# Patient Record
Sex: FEMALE | Race: WHITE | Employment: UNEMPLOYED | URBAN - METROPOLITAN AREA
[De-identification: names, ages, dates, MRNs, and addresses within clinical notes are randomized per-mention and may not be internally consistent; named-entity substitution may affect disease eponyms.]

---

## 2019-09-27 ENCOUNTER — TELEPHONE (OUTPATIENT)
Dept: OBGYN | Age: 24
End: 2019-09-27

## 2019-10-15 ENCOUNTER — HOSPITAL ENCOUNTER (OUTPATIENT)
Age: 24
Discharge: HOME | End: 2019-10-15
Payer: MEDICAID

## 2019-10-15 ENCOUNTER — INITIAL PRENATAL (OUTPATIENT)
Dept: OBGYN | Age: 24
End: 2019-10-15
Payer: MEDICAID

## 2019-10-15 VITALS
WEIGHT: 150 LBS | DIASTOLIC BLOOD PRESSURE: 68 MMHG | SYSTOLIC BLOOD PRESSURE: 121 MMHG | HEART RATE: 89 BPM | HEIGHT: 67 IN | BODY MASS INDEX: 23.54 KG/M2

## 2019-10-15 DIAGNOSIS — Z34.02 ENCOUNTER FOR SUPERVISION OF NORMAL FIRST PREGNANCY IN SECOND TRIMESTER: Primary | ICD-10-CM

## 2019-10-15 LAB
ABO/RH: NORMAL
ANTIBODY SCREEN: NORMAL
BILIRUBIN URINE: NEGATIVE MG/DL
BLOOD, URINE: ABNORMAL
CLARITY: CLEAR
COLOR: YELLOW
GLUCOSE URINE: NEGATIVE MG/DL
HEPATITIS C ANTIBODY INTERPRETATION: NORMAL
KETONES, URINE: NEGATIVE MG/DL
LEUKOCYTE ESTERASE, URINE: NEGATIVE
NITRITE, URINE: NEGATIVE
PH UA: 6.5 (ref 5–8)
PROTEIN UA: NEGATIVE MG/DL
SPECIFIC GRAVITY UA: 1.02 (ref 1–1.03)
UROBILINOGEN, URINE: 0.2 E.U./DL

## 2019-10-15 PROCEDURE — 86780 TREPONEMA PALLIDUM: CPT

## 2019-10-15 PROCEDURE — 88175 CYTOPATH C/V AUTO FLUID REDO: CPT

## 2019-10-15 PROCEDURE — 86701 HIV-1ANTIBODY: CPT

## 2019-10-15 PROCEDURE — 85025 COMPLETE CBC W/AUTO DIFF WBC: CPT

## 2019-10-15 PROCEDURE — 87086 URINE CULTURE/COLONY COUNT: CPT

## 2019-10-15 PROCEDURE — 86702 HIV-2 ANTIBODY: CPT

## 2019-10-15 PROCEDURE — 86901 BLOOD TYPING SEROLOGIC RH(D): CPT

## 2019-10-15 PROCEDURE — 86900 BLOOD TYPING SEROLOGIC ABO: CPT

## 2019-10-15 PROCEDURE — 87491 CHLMYD TRACH DNA AMP PROBE: CPT

## 2019-10-15 PROCEDURE — 36415 COLL VENOUS BLD VENIPUNCTURE: CPT

## 2019-10-15 PROCEDURE — 99204 OFFICE O/P NEW MOD 45 MIN: CPT | Performed by: OBSTETRICS & GYNECOLOGY

## 2019-10-15 PROCEDURE — 87390 HIV-1 AG IA: CPT

## 2019-10-15 PROCEDURE — 86762 RUBELLA ANTIBODY: CPT

## 2019-10-15 PROCEDURE — 86850 RBC ANTIBODY SCREEN: CPT

## 2019-10-15 PROCEDURE — 87624 HPV HI-RISK TYP POOLED RSLT: CPT

## 2019-10-15 PROCEDURE — 87591 N.GONORRHOEAE DNA AMP PROB: CPT

## 2019-10-15 PROCEDURE — 81003 URINALYSIS AUTO W/O SCOPE: CPT

## 2019-10-15 PROCEDURE — 87340 HEPATITIS B SURFACE AG IA: CPT

## 2019-10-15 PROCEDURE — 86803 HEPATITIS C AB TEST: CPT

## 2019-10-15 RX ORDER — VITAMIN A, VITAMIN C, VITAMIN D-3, VITAMIN E, VITAMIN B-1, VITAMIN B-2, NIACIN, VITAMIN B-6, CALCIUM, IRON, ZINC, COPPER 4000; 120; 400; 22; 1.84; 3; 20; 10; 1; 12; 200; 27; 25; 2 [IU]/1; MG/1; [IU]/1; MG/1; MG/1; MG/1; MG/1; MG/1; MG/1; UG/1; MG/1; MG/1; MG/1; MG/1
1 TABLET ORAL DAILY
Qty: 30 TABLET | Refills: 11 | Status: ON HOLD | OUTPATIENT
Start: 2019-10-15 | End: 2020-03-27 | Stop reason: HOSPADM

## 2019-10-16 LAB
BASOPHILS ABSOLUTE: 0 K/UL (ref 0–0.2)
BASOPHILS RELATIVE PERCENT: 0.4 %
EOSINOPHILS ABSOLUTE: 0.1 K/UL (ref 0–0.6)
EOSINOPHILS RELATIVE PERCENT: 1.3 %
HCT VFR BLD CALC: 35.1 % (ref 36–48)
HEMOGLOBIN: 12 G/DL (ref 12–16)
HEPATITIS B SURFACE ANTIGEN INTERPRETATION: ABNORMAL
HIV AG/AB: NORMAL
HIV ANTIGEN: NORMAL
HIV-1 ANTIBODY: NORMAL
HIV-2 AB: NORMAL
LYMPHOCYTES ABSOLUTE: 1.2 K/UL (ref 1–5.1)
LYMPHOCYTES RELATIVE PERCENT: 15.8 %
MCH RBC QN AUTO: 30.2 PG (ref 26–34)
MCHC RBC AUTO-ENTMCNC: 34.2 G/DL (ref 31–36)
MCV RBC AUTO: 88.4 FL (ref 80–100)
MONOCYTES ABSOLUTE: 0.4 K/UL (ref 0–1.3)
MONOCYTES RELATIVE PERCENT: 5.9 %
NEUTROPHILS ABSOLUTE: 5.7 K/UL (ref 1.7–7.7)
NEUTROPHILS RELATIVE PERCENT: 76.6 %
PDW BLD-RTO: 12.3 % (ref 12.4–15.4)
PLATELET # BLD: 246 K/UL (ref 135–450)
PMV BLD AUTO: 8.5 FL (ref 5–10.5)
RBC # BLD: 3.96 M/UL (ref 4–5.2)
RUBELLA ANTIBODY IGG: 112 IU/ML
TOTAL SYPHILLIS IGG/IGM: ABNORMAL
URINE CULTURE, ROUTINE: NORMAL
WBC # BLD: 7.4 K/UL (ref 4–11)

## 2019-10-18 LAB
C. TRACHOMATIS DNA,THIN PREP: NEGATIVE
N. GONORRHOEAE DNA, THIN PREP: NEGATIVE

## 2019-10-22 LAB
HPV COMMENT: ABNORMAL
HPV TYPE 16: NOT DETECTED
HPV TYPE 18: NOT DETECTED
HPVOH (OTHER TYPES): DETECTED

## 2019-11-14 ENCOUNTER — HOSPITAL ENCOUNTER (OUTPATIENT)
Dept: ULTRASOUND IMAGING | Age: 24
Discharge: HOME | End: 2019-11-14
Payer: MEDICAID

## 2019-11-14 ENCOUNTER — ROUTINE PRENATAL (OUTPATIENT)
Dept: OBGYN | Age: 24
End: 2019-11-14
Payer: MEDICAID

## 2019-11-14 VITALS
WEIGHT: 153 LBS | BODY MASS INDEX: 23.96 KG/M2 | HEART RATE: 88 BPM | SYSTOLIC BLOOD PRESSURE: 118 MMHG | DIASTOLIC BLOOD PRESSURE: 67 MMHG

## 2019-11-14 DIAGNOSIS — Z34.02 ENCOUNTER FOR SUPERVISION OF NORMAL FIRST PREGNANCY IN SECOND TRIMESTER: ICD-10-CM

## 2019-11-14 DIAGNOSIS — R87.612 LGSIL ON PAP SMEAR OF CERVIX: ICD-10-CM

## 2019-11-14 DIAGNOSIS — Z34.92 PRENATAL CARE IN SECOND TRIMESTER: Primary | ICD-10-CM

## 2019-11-14 PROCEDURE — 76805 OB US >/= 14 WKS SNGL FETUS: CPT

## 2019-11-14 PROCEDURE — 81003 URINALYSIS AUTO W/O SCOPE: CPT

## 2019-12-12 ENCOUNTER — HOSPITAL ENCOUNTER (OUTPATIENT)
Age: 24
Discharge: HOME | End: 2019-12-12
Payer: MEDICAID

## 2019-12-12 ENCOUNTER — ROUTINE PRENATAL (OUTPATIENT)
Dept: OBGYN | Age: 24
End: 2019-12-12
Payer: MEDICAID

## 2019-12-12 VITALS
BODY MASS INDEX: 24.43 KG/M2 | HEART RATE: 87 BPM | WEIGHT: 156 LBS | DIASTOLIC BLOOD PRESSURE: 67 MMHG | SYSTOLIC BLOOD PRESSURE: 120 MMHG

## 2019-12-12 DIAGNOSIS — Z34.02 ENCOUNTER FOR SUPERVISION OF NORMAL FIRST PREGNANCY IN SECOND TRIMESTER: ICD-10-CM

## 2019-12-12 DIAGNOSIS — Z34.92 PRENATAL CARE IN SECOND TRIMESTER: ICD-10-CM

## 2019-12-12 DIAGNOSIS — Z34.02 ENCOUNTER FOR SUPERVISION OF NORMAL FIRST PREGNANCY IN SECOND TRIMESTER: Primary | ICD-10-CM

## 2019-12-12 LAB
BILIRUBIN URINE: NEGATIVE MG/DL
BLOOD, URINE: NEGATIVE
CLARITY: CLEAR
COLOR: YELLOW
GLUCOSE CHALLENGE: 106 MG/DL
GLUCOSE URINE: NEGATIVE MG/DL
HCT VFR BLD CALC: 33 % (ref 36–48)
HEMOGLOBIN: 11.3 G/DL (ref 12–16)
KETONES, URINE: NEGATIVE MG/DL
LEUKOCYTE ESTERASE, URINE: NEGATIVE
MCH RBC QN AUTO: 30.7 PG (ref 26–34)
MCHC RBC AUTO-ENTMCNC: 34.1 G/DL (ref 31–36)
MCV RBC AUTO: 90 FL (ref 80–100)
NITRITE, URINE: NEGATIVE
PDW BLD-RTO: 13.2 % (ref 12.4–15.4)
PH UA: 6.5 (ref 5–8)
PLATELET # BLD: 229 K/UL (ref 135–450)
PMV BLD AUTO: 9 FL (ref 5–10.5)
PROTEIN UA: ABNORMAL MG/DL
RBC # BLD: 3.66 M/UL (ref 4–5.2)
SPECIFIC GRAVITY UA: 1.01 (ref 1–1.03)
UROBILINOGEN, URINE: 0.2 E.U./DL
WBC # BLD: 8.7 K/UL (ref 4–11)

## 2019-12-12 PROCEDURE — 85027 COMPLETE CBC AUTOMATED: CPT

## 2019-12-12 PROCEDURE — 81003 URINALYSIS AUTO W/O SCOPE: CPT

## 2019-12-12 PROCEDURE — 99212 OFFICE O/P EST SF 10 MIN: CPT | Performed by: OBSTETRICS & GYNECOLOGY

## 2019-12-12 PROCEDURE — 36415 COLL VENOUS BLD VENIPUNCTURE: CPT

## 2019-12-12 PROCEDURE — 82950 GLUCOSE TEST: CPT

## 2020-01-09 ENCOUNTER — ROUTINE PRENATAL (OUTPATIENT)
Dept: OBGYN | Age: 25
End: 2020-01-09
Payer: MEDICAID

## 2020-01-09 VITALS
DIASTOLIC BLOOD PRESSURE: 70 MMHG | HEART RATE: 85 BPM | SYSTOLIC BLOOD PRESSURE: 112 MMHG | BODY MASS INDEX: 26.16 KG/M2 | WEIGHT: 167 LBS

## 2020-01-09 LAB
BILIRUBIN URINE: NEGATIVE MG/DL
BLOOD, URINE: NEGATIVE
CLARITY: CLEAR
COLOR: YELLOW
GLUCOSE URINE: NEGATIVE MG/DL
KETONES, URINE: NEGATIVE MG/DL
LEUKOCYTE ESTERASE, URINE: NEGATIVE
NITRITE, URINE: NEGATIVE
PH UA: 6 (ref 5–8)
PROTEIN UA: NEGATIVE MG/DL
SPECIFIC GRAVITY UA: 1.02 (ref 1–1.03)
UROBILINOGEN, URINE: 0.2 E.U./DL

## 2020-01-09 PROCEDURE — 99212 OFFICE O/P EST SF 10 MIN: CPT | Performed by: OBSTETRICS & GYNECOLOGY

## 2020-01-09 PROCEDURE — 90471 IMMUNIZATION ADMIN: CPT | Performed by: OBSTETRICS & GYNECOLOGY

## 2020-01-09 PROCEDURE — 6360000002 HC RX W HCPCS: Performed by: OBSTETRICS & GYNECOLOGY

## 2020-01-09 PROCEDURE — 81003 URINALYSIS AUTO W/O SCOPE: CPT

## 2020-01-09 PROCEDURE — 90715 TDAP VACCINE 7 YRS/> IM: CPT | Performed by: OBSTETRICS & GYNECOLOGY

## 2020-01-09 RX ADMIN — TETANUS TOXOID, REDUCED DIPHTHERIA TOXOID AND ACELLULAR PERTUSSIS VACCINE, ADSORBED 0.5 ML: 5; 2.5; 8; 8; 2.5 SUSPENSION INTRAMUSCULAR at 10:17

## 2020-01-09 NOTE — PROGRESS NOTES
Pt with no c/o today; states baby is active. Patient denies vaginal bleeding and leaking of fluid/ctx.

## 2020-01-23 ENCOUNTER — ROUTINE PRENATAL (OUTPATIENT)
Dept: OBGYN | Age: 25
End: 2020-01-23
Payer: MEDICAID

## 2020-01-23 VITALS
DIASTOLIC BLOOD PRESSURE: 71 MMHG | HEART RATE: 85 BPM | WEIGHT: 172 LBS | BODY MASS INDEX: 26.94 KG/M2 | SYSTOLIC BLOOD PRESSURE: 118 MMHG

## 2020-01-23 LAB
BILIRUBIN URINE: NEGATIVE MG/DL
BLOOD, URINE: NEGATIVE
CLARITY: CLEAR
COLOR: YELLOW
GLUCOSE URINE: NEGATIVE MG/DL
KETONES, URINE: NEGATIVE MG/DL
LEUKOCYTE ESTERASE, URINE: NEGATIVE
NITRITE, URINE: NEGATIVE
PH UA: 6.5 (ref 5–8)
PROTEIN UA: NEGATIVE MG/DL
SPECIFIC GRAVITY UA: 1.02 (ref 1–1.03)
UROBILINOGEN, URINE: 0.2 E.U./DL

## 2020-01-23 PROCEDURE — 81003 URINALYSIS AUTO W/O SCOPE: CPT

## 2020-01-23 PROCEDURE — 99213 OFFICE O/P EST LOW 20 MIN: CPT | Performed by: OBSTETRICS & GYNECOLOGY

## 2020-02-06 ENCOUNTER — ROUTINE PRENATAL (OUTPATIENT)
Dept: OBGYN | Age: 25
End: 2020-02-06
Payer: MEDICAID

## 2020-02-06 VITALS
WEIGHT: 174.8 LBS | HEART RATE: 100 BPM | DIASTOLIC BLOOD PRESSURE: 73 MMHG | BODY MASS INDEX: 27.38 KG/M2 | SYSTOLIC BLOOD PRESSURE: 125 MMHG

## 2020-02-06 LAB
BILIRUBIN URINE: NEGATIVE MG/DL
BLOOD, URINE: NEGATIVE
CLARITY: CLEAR
COLOR: YELLOW
GLUCOSE URINE: NEGATIVE MG/DL
KETONES, URINE: NEGATIVE MG/DL
LEUKOCYTE ESTERASE, URINE: NEGATIVE
NITRITE, URINE: NEGATIVE
PH UA: 7 (ref 5–8)
PROTEIN UA: NEGATIVE MG/DL
SPECIFIC GRAVITY UA: 1.02 (ref 1–1.03)
UROBILINOGEN, URINE: 0.2 E.U./DL

## 2020-02-06 PROCEDURE — 99213 OFFICE O/P EST LOW 20 MIN: CPT | Performed by: OBSTETRICS & GYNECOLOGY

## 2020-02-06 PROCEDURE — 81003 URINALYSIS AUTO W/O SCOPE: CPT

## 2020-02-15 ENCOUNTER — HOSPITAL ENCOUNTER (OUTPATIENT)
Age: 25
Discharge: HOME | End: 2020-02-15
Attending: OBSTETRICS & GYNECOLOGY | Admitting: OBSTETRICS & GYNECOLOGY
Payer: MEDICAID

## 2020-02-15 VITALS
HEART RATE: 110 BPM | WEIGHT: 174 LBS | RESPIRATION RATE: 18 BRPM | BODY MASS INDEX: 27.31 KG/M2 | DIASTOLIC BLOOD PRESSURE: 77 MMHG | HEIGHT: 67 IN | SYSTOLIC BLOOD PRESSURE: 123 MMHG | TEMPERATURE: 97.9 F

## 2020-02-15 PROBLEM — O09.899 RISK OF PRETERM LABOR: Status: ACTIVE | Noted: 2020-02-15

## 2020-02-15 PROBLEM — O09.219 RISK OF PRETERM LABOR: Status: ACTIVE | Noted: 2020-02-15

## 2020-02-15 LAB
AMPHETAMINE SCREEN, URINE: NORMAL
BARBITURATE SCREEN URINE: NORMAL
BENZODIAZEPINE SCREEN, URINE: NORMAL
BILIRUBIN URINE: NEGATIVE
BLOOD, URINE: NEGATIVE
BUPRENORPHINE URINE: NORMAL
CANNABINOID SCREEN URINE: NORMAL
CLARITY: CLEAR
COCAINE METABOLITE SCREEN URINE: NORMAL
COLOR: YELLOW
GLUCOSE URINE: NEGATIVE MG/DL
KETONES, URINE: ABNORMAL MG/DL
LEUKOCYTE ESTERASE, URINE: NEGATIVE
Lab: NORMAL
METHADONE SCREEN, URINE: NORMAL
MICROSCOPIC EXAMINATION: ABNORMAL
NITRITE, URINE: NEGATIVE
OPIATE SCREEN URINE: NORMAL
OXYCODONE URINE: NORMAL
PH UA: 5
PH UA: 6 (ref 5–8)
PHENCYCLIDINE SCREEN URINE: NORMAL
PROPOXYPHENE SCREEN: NORMAL
PROTEIN UA: NEGATIVE MG/DL
SPECIFIC GRAVITY UA: 1.02 (ref 1–1.03)
URINE TYPE: ABNORMAL
UROBILINOGEN, URINE: 1 E.U./DL

## 2020-02-15 PROCEDURE — 81003 URINALYSIS AUTO W/O SCOPE: CPT

## 2020-02-15 PROCEDURE — 99211 OFF/OP EST MAY X REQ PHY/QHP: CPT

## 2020-02-15 PROCEDURE — 80307 DRUG TEST PRSMV CHEM ANLYZR: CPT

## 2020-02-15 PROCEDURE — 59025 FETAL NON-STRESS TEST: CPT

## 2020-02-15 NOTE — FLOWSHEET NOTE
RN explained all discharge paperwork to pt at this time and pt verbally acknowledged understanding. Pt signed and was discharged home, ambulatory and undelivered at this time.

## 2020-02-15 NOTE — PROCEDURES
FETAL SURVEILLANCE TESTING SUMMARY    INDICATIONS:  Fetal well being    OBJECTIVE RESULTS:  Fetal heart variability: moderate  Fetal Heart Rate decelerations: none  Fetal Heart Rate accelerations: yes  Baseline FHR: 150 per minute  Fetal Non-stress Test: reactive    Fetal surveillance: reassuring

## 2020-02-15 NOTE — FLOWSHEET NOTE
RN called Dr Navjot Terrell at this time with a status update on pt. Fetal tracing reactive and pt's SVE closed. New orders for pt to be discharged and follow up in the clinic for her normal routine appointment.

## 2020-02-15 NOTE — FLOWSHEET NOTE
Pt presenting to triage with complaints of falling while at work. 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 Pt denies any vaginal bleeding or leaking of fluid. Pt states that she has cramping in her back and abdomen on her left side. RN explained all consents and pt signed them. EFM placed at this time. RN will monitor.

## 2020-02-20 ENCOUNTER — ROUTINE PRENATAL (OUTPATIENT)
Dept: OBGYN | Age: 25
End: 2020-02-20
Payer: MEDICAID

## 2020-02-20 VITALS
HEART RATE: 102 BPM | SYSTOLIC BLOOD PRESSURE: 121 MMHG | BODY MASS INDEX: 27.25 KG/M2 | DIASTOLIC BLOOD PRESSURE: 82 MMHG | WEIGHT: 174 LBS

## 2020-02-20 PROCEDURE — 81003 URINALYSIS AUTO W/O SCOPE: CPT

## 2020-02-20 PROCEDURE — 99212 OFFICE O/P EST SF 10 MIN: CPT | Performed by: OBSTETRICS & GYNECOLOGY

## 2020-02-20 NOTE — PROGRESS NOTES
Routine visit, no complaints offered denies cramping bleeding or leaking fluid. Meeting kick counts.

## 2020-02-26 PROBLEM — R87.612 LGSIL ON PAP SMEAR OF CERVIX: Status: ACTIVE | Noted: 2020-02-26

## 2020-02-27 ENCOUNTER — ROUTINE PRENATAL (OUTPATIENT)
Dept: OBGYN | Age: 25
End: 2020-02-27
Payer: MEDICAID

## 2020-02-27 VITALS
HEART RATE: 104 BPM | BODY MASS INDEX: 27.72 KG/M2 | WEIGHT: 177 LBS | DIASTOLIC BLOOD PRESSURE: 74 MMHG | SYSTOLIC BLOOD PRESSURE: 116 MMHG

## 2020-02-27 LAB
BILIRUBIN URINE: NEGATIVE MG/DL
BLOOD, URINE: NEGATIVE
CLARITY: CLEAR
COLOR: YELLOW
GLUCOSE URINE: NEGATIVE MG/DL
KETONES, URINE: NEGATIVE MG/DL
LEUKOCYTE ESTERASE, URINE: NEGATIVE
NITRITE, URINE: NEGATIVE
PH UA: 7 (ref 5–8)
PROTEIN UA: NEGATIVE MG/DL
SPECIFIC GRAVITY UA: 1.01 (ref 1–1.03)
UROBILINOGEN, URINE: 0.2 E.U./DL

## 2020-02-27 PROCEDURE — 81003 URINALYSIS AUTO W/O SCOPE: CPT

## 2020-02-27 PROCEDURE — 87081 CULTURE SCREEN ONLY: CPT

## 2020-02-27 NOTE — PROGRESS NOTES
Reports + fetal movement, denies bleeding, leaking of fluid or pain. Advised to choose ped, daily ANA LUISA and GBS today.

## 2020-02-27 NOTE — PROGRESS NOTES
Social Service Note:  Met with patient to complete the depression scale and give a packet of information on  SIDs,  and Car Seat Safety. Patient scored a 9  on depression scale and is not showing symptoms or signs of depression. Pt has a crib and car seat.      Brandee TOLBERT, [place]

## 2020-03-01 LAB — GROUP B STREP CULTURE: NORMAL

## 2020-03-05 ENCOUNTER — ROUTINE PRENATAL (OUTPATIENT)
Dept: OBGYN | Age: 25
End: 2020-03-05
Payer: MEDICAID

## 2020-03-05 VITALS
HEART RATE: 89 BPM | WEIGHT: 177.8 LBS | DIASTOLIC BLOOD PRESSURE: 80 MMHG | SYSTOLIC BLOOD PRESSURE: 121 MMHG | BODY MASS INDEX: 27.85 KG/M2

## 2020-03-05 LAB
BILIRUBIN URINE: NEGATIVE MG/DL
BLOOD, URINE: NEGATIVE
CLARITY: CLEAR
COLOR: YELLOW
GLUCOSE URINE: NEGATIVE MG/DL
KETONES, URINE: NEGATIVE MG/DL
LEUKOCYTE ESTERASE, URINE: ABNORMAL
NITRITE, URINE: NEGATIVE
PH UA: 7 (ref 5–8)
PROTEIN UA: NEGATIVE MG/DL
SPECIFIC GRAVITY UA: 1.01 (ref 1–1.03)
UROBILINOGEN, URINE: 0.2 E.U./DL

## 2020-03-05 PROCEDURE — 99213 OFFICE O/P EST LOW 20 MIN: CPT | Performed by: OBSTETRICS & GYNECOLOGY

## 2020-03-05 PROCEDURE — 81003 URINALYSIS AUTO W/O SCOPE: CPT

## 2020-03-05 NOTE — PROGRESS NOTES
Pt with no c/o today; states baby is active and meeting daily kick counts. Patient denies vaginal bleeding and leaking of fluid/ctx.

## 2020-03-12 ENCOUNTER — ROUTINE PRENATAL (OUTPATIENT)
Dept: OBGYN | Age: 25
End: 2020-03-12
Payer: MEDICAID

## 2020-03-12 VITALS
HEART RATE: 77 BPM | WEIGHT: 180 LBS | BODY MASS INDEX: 28.19 KG/M2 | SYSTOLIC BLOOD PRESSURE: 133 MMHG | DIASTOLIC BLOOD PRESSURE: 77 MMHG

## 2020-03-12 PROCEDURE — 81003 URINALYSIS AUTO W/O SCOPE: CPT

## 2020-03-19 ENCOUNTER — ROUTINE PRENATAL (OUTPATIENT)
Dept: OBGYN | Age: 25
End: 2020-03-19
Payer: MEDICAID

## 2020-03-19 VITALS — BODY MASS INDEX: 28.35 KG/M2 | WEIGHT: 181 LBS

## 2020-03-19 LAB
BILIRUBIN URINE: NEGATIVE MG/DL
BLOOD, URINE: NEGATIVE
CLARITY: CLEAR
COLOR: YELLOW
GLUCOSE URINE: NEGATIVE MG/DL
KETONES, URINE: NEGATIVE MG/DL
LEUKOCYTE ESTERASE, URINE: ABNORMAL
NITRITE, URINE: NEGATIVE
PH UA: 6.5 (ref 5–8)
PROTEIN UA: NEGATIVE MG/DL
SPECIFIC GRAVITY UA: 1.02 (ref 1–1.03)
UROBILINOGEN, URINE: 0.2 E.U./DL

## 2020-03-19 PROCEDURE — 99212 OFFICE O/P EST SF 10 MIN: CPT | Performed by: OBSTETRICS & GYNECOLOGY

## 2020-03-19 PROCEDURE — 81003 URINALYSIS AUTO W/O SCOPE: CPT

## 2020-03-26 ENCOUNTER — ANESTHESIA EVENT (OUTPATIENT)
Dept: LABOR AND DELIVERY | Age: 25
DRG: 540 | End: 2020-03-26
Payer: MEDICAID

## 2020-03-26 ENCOUNTER — HOSPITAL ENCOUNTER (INPATIENT)
Age: 25
LOS: 4 days | Discharge: HOME | DRG: 540 | End: 2020-03-30
Attending: OBSTETRICS & GYNECOLOGY | Admitting: OBSTETRICS & GYNECOLOGY
Payer: MEDICAID

## 2020-03-26 ENCOUNTER — ANESTHESIA (OUTPATIENT)
Dept: LABOR AND DELIVERY | Age: 25
DRG: 540 | End: 2020-03-26
Payer: MEDICAID

## 2020-03-26 LAB
AMPHETAMINE SCREEN, URINE: NORMAL
BARBITURATE SCREEN URINE: NORMAL
BASOPHILS ABSOLUTE: 0.1 K/UL (ref 0–0.2)
BASOPHILS RELATIVE PERCENT: 0.7 %
BENZODIAZEPINE SCREEN, URINE: NORMAL
BUPRENORPHINE URINE: NORMAL
CANNABINOID SCREEN URINE: NORMAL
COCAINE METABOLITE SCREEN URINE: NORMAL
EOSINOPHILS ABSOLUTE: 0.1 K/UL (ref 0–0.6)
EOSINOPHILS RELATIVE PERCENT: 0.8 %
HCT VFR BLD CALC: 34.2 % (ref 36–48)
HEMOGLOBIN: 11.6 G/DL (ref 12–16)
LYMPHOCYTES ABSOLUTE: 1.9 K/UL (ref 1–5.1)
LYMPHOCYTES RELATIVE PERCENT: 14.5 %
Lab: NORMAL
MCH RBC QN AUTO: 29 PG (ref 26–34)
MCHC RBC AUTO-ENTMCNC: 34 G/DL (ref 31–36)
MCV RBC AUTO: 85.2 FL (ref 80–100)
METHADONE SCREEN, URINE: NORMAL
MONOCYTES ABSOLUTE: 0.7 K/UL (ref 0–1.3)
MONOCYTES RELATIVE PERCENT: 5.6 %
NEUTROPHILS ABSOLUTE: 10.3 K/UL (ref 1.7–7.7)
NEUTROPHILS RELATIVE PERCENT: 78.4 %
OPIATE SCREEN URINE: NORMAL
OXYCODONE URINE: NORMAL
PDW BLD-RTO: 13.5 % (ref 12.4–15.4)
PH UA: 6
PHENCYCLIDINE SCREEN URINE: NORMAL
PLATELET # BLD: 268 K/UL (ref 135–450)
PMV BLD AUTO: 7.8 FL (ref 5–10.5)
PROPOXYPHENE SCREEN: NORMAL
RBC # BLD: 4.01 M/UL (ref 4–5.2)
SPECIMEN STATUS: NORMAL
TOTAL SYPHILLIS IGG/IGM: NORMAL
WBC # BLD: 13.1 K/UL (ref 4–11)

## 2020-03-26 PROCEDURE — 85025 COMPLETE CBC W/AUTO DIFF WBC: CPT

## 2020-03-26 PROCEDURE — 1220000000 HC SEMI PRIVATE OB R&B

## 2020-03-26 PROCEDURE — 2500000003 HC RX 250 WO HCPCS: Performed by: NURSE ANESTHETIST, CERTIFIED REGISTERED

## 2020-03-26 PROCEDURE — 80307 DRUG TEST PRSMV CHEM ANLYZR: CPT

## 2020-03-26 PROCEDURE — 3700000025 EPIDURAL BLOCK: Performed by: ANESTHESIOLOGY

## 2020-03-26 PROCEDURE — 2580000003 HC RX 258: Performed by: OBSTETRICS & GYNECOLOGY

## 2020-03-26 PROCEDURE — 86780 TREPONEMA PALLIDUM: CPT

## 2020-03-26 PROCEDURE — 2500000003 HC RX 250 WO HCPCS: Performed by: ANESTHESIOLOGY

## 2020-03-26 PROCEDURE — 6360000002 HC RX W HCPCS: Performed by: OBSTETRICS & GYNECOLOGY

## 2020-03-26 RX ORDER — BUPIVACAINE HYDROCHLORIDE 2.5 MG/ML
INJECTION, SOLUTION EPIDURAL; INFILTRATION; INTRACAUDAL PRN
Status: DISCONTINUED | OUTPATIENT
Start: 2020-03-26 | End: 2020-03-27 | Stop reason: SDUPTHER

## 2020-03-26 RX ORDER — TERBUTALINE SULFATE 1 MG/ML
0.25 INJECTION, SOLUTION SUBCUTANEOUS ONCE
Status: DISCONTINUED | OUTPATIENT
Start: 2020-03-26 | End: 2020-03-27

## 2020-03-26 RX ORDER — SODIUM CHLORIDE 0.9 % (FLUSH) 0.9 %
10 SYRINGE (ML) INJECTION PRN
Status: DISCONTINUED | OUTPATIENT
Start: 2020-03-26 | End: 2020-03-27

## 2020-03-26 RX ORDER — SODIUM CHLORIDE, SODIUM LACTATE, POTASSIUM CHLORIDE, CALCIUM CHLORIDE 600; 310; 30; 20 MG/100ML; MG/100ML; MG/100ML; MG/100ML
INJECTION, SOLUTION INTRAVENOUS CONTINUOUS
Status: DISCONTINUED | OUTPATIENT
Start: 2020-03-26 | End: 2020-03-27

## 2020-03-26 RX ORDER — ONDANSETRON 2 MG/ML
4 INJECTION INTRAMUSCULAR; INTRAVENOUS EVERY 6 HOURS PRN
Status: DISCONTINUED | OUTPATIENT
Start: 2020-03-26 | End: 2020-03-27

## 2020-03-26 RX ORDER — LIDOCAINE HYDROCHLORIDE 15 MG/ML
INJECTION, SOLUTION EPIDURAL; INFILTRATION; INTRACAUDAL; PERINEURAL PRN
Status: DISCONTINUED | OUTPATIENT
Start: 2020-03-26 | End: 2020-03-27 | Stop reason: SDUPTHER

## 2020-03-26 RX ORDER — SODIUM CHLORIDE 0.9 % (FLUSH) 0.9 %
10 SYRINGE (ML) INJECTION EVERY 12 HOURS SCHEDULED
Status: DISCONTINUED | OUTPATIENT
Start: 2020-03-26 | End: 2020-03-27

## 2020-03-26 RX ORDER — DOCUSATE SODIUM 100 MG/1
100 CAPSULE, LIQUID FILLED ORAL 2 TIMES DAILY
Status: DISCONTINUED | OUTPATIENT
Start: 2020-03-26 | End: 2020-03-27

## 2020-03-26 RX ADMIN — LIDOCAINE HYDROCHLORIDE 2 ML: 15 INJECTION, SOLUTION EPIDURAL; INFILTRATION; INTRACAUDAL; PERINEURAL at 17:35

## 2020-03-26 RX ADMIN — BUPIVACAINE HYDROCHLORIDE 5 ML: 2.5 INJECTION, SOLUTION EPIDURAL; INFILTRATION; INTRACAUDAL; PERINEURAL at 23:29

## 2020-03-26 RX ADMIN — SODIUM CHLORIDE, POTASSIUM CHLORIDE, SODIUM LACTATE AND CALCIUM CHLORIDE: 600; 310; 30; 20 INJECTION, SOLUTION INTRAVENOUS at 08:30

## 2020-03-26 RX ADMIN — SODIUM CHLORIDE, POTASSIUM CHLORIDE, SODIUM LACTATE AND CALCIUM CHLORIDE: 600; 310; 30; 20 INJECTION, SOLUTION INTRAVENOUS at 23:54

## 2020-03-26 RX ADMIN — Medication 1 MILLI-UNITS/MIN: at 10:15

## 2020-03-26 RX ADMIN — SODIUM CHLORIDE, POTASSIUM CHLORIDE, SODIUM LACTATE AND CALCIUM CHLORIDE: 600; 310; 30; 20 INJECTION, SOLUTION INTRAVENOUS at 12:42

## 2020-03-26 RX ADMIN — LIDOCAINE HYDROCHLORIDE 3 ML: 15 INJECTION, SOLUTION EPIDURAL; INFILTRATION; INTRACAUDAL; PERINEURAL at 17:32

## 2020-03-26 RX ADMIN — Medication 12 ML/HR: at 17:39

## 2020-03-26 RX ADMIN — SODIUM CHLORIDE, POTASSIUM CHLORIDE, SODIUM LACTATE AND CALCIUM CHLORIDE: 600; 310; 30; 20 INJECTION, SOLUTION INTRAVENOUS at 17:28

## 2020-03-26 NOTE — H&P
Types: Cocaine    Sexual activity: Yes     Partners: Male   Lifestyle    Physical activity     Days per week: Not on file     Minutes per session: Not on file    Stress: Not on file   Relationships    Social connections     Talks on phone: Not on file     Gets together: Not on file     Attends Holiness service: Not on file     Active member of club or organization: Not on file     Attends meetings of clubs or organizations: Not on file     Relationship status: Not on file    Intimate partner violence     Fear of current or ex partner: Not on file     Emotionally abused: Not on file     Physically abused: Not on file     Forced sexual activity: Not on file   Other Topics Concern    Not on file   Social History Narrative    ** Merged History Encounter **          Family History:       Problem Relation Age of Onset    Breast Cancer Maternal Grandmother     Diabetes Mother     Hypertension Mother     Cancer Mother      Medications Prior to Admission:  Medications Prior to Admission: Prenatal Vit-Fe Fumarate-FA (PRENATAL VITAMIN PLUS LOW IRON) 27-1 MG TABS, Take 1 tablet by mouth daily  famotidine (PEPCID) 20 MG tablet, Take 1 tablet by mouth 2 times daily (Patient not taking: Reported on 1/9/2020)    REVIEW OF SYSTEMS:    CONSTITUTIONAL:  negative  RESPIRATORY:  negative  CARDIOVASCULAR:  negative  GASTROINTESTINAL:  negative  ALLERGIC/IMMUNOLOGIC:  negative  NEUROLOGICAL:  negative  BEHAVIOR/PSYCH:  negative    PHYSICAL EXAM:  Vitals:    03/26/20 0759 03/26/20 0803 03/26/20 0807 03/26/20 0811   BP: 117/81 117/80 137/77 127/82   Pulse: 113 116 114 99   Resp:       Temp:       TempSrc:         General appearance:  awake, alert, cooperative, no apparent distress, and appears stated age  Neurologic:  Awake, alert, oriented to name, place and time.     Lungs:  No increased work of breathing, good air exchange  Abdomen:  Soft, non tender, gravid, consistent with her gestational age, EFW by Kayleigh burksouever was 3200   Fetal heart rate:  Reassuring.   Pelvis:  Adequate pelvis  Cervix: 1 cm 25% medium -2  Contraction frequency:  0 minutes    Membranes:  Ruptured clear fluid    ASSESSMENT AND PLAN:    Labor: Admit, anticipate normal delivery, routine labor orders  Fetus: Reassuring  GBS: No  Other: Pitocin induction for SROM

## 2020-03-26 NOTE — ANESTHESIA PROCEDURE NOTES
Epidural Block    Patient location during procedure: OB  Start time: 3/26/2020 5:25 PM  Reason for block: labor epidural  Staffing  Anesthesiologist: Vivian Dumont MD  Resident/CRNA: KISHORE Grajeda - CRNA  Performed: resident/CRNA   Preanesthetic Checklist  Completed: patient identified, pre-op evaluation, timeout performed, IV checked, risks and benefits discussed, monitors and equipment checked, anesthesia consent given, oxygen available and patient being monitored  Epidural  Patient position: sitting  Prep: ChloraPrep and x2  Patient monitoring: continuous pulse ox and frequent blood pressure checks  Approach: midline  Location: lumbar (1-5)  Injection technique: ERICA saline  Provider prep: mask and sterile gloves  Needle  Needle type: Tuohy   Needle gauge: 17 G  Needle length: 3.5 in  Needle insertion depth: 5 cm  Catheter type: side hole  Catheter size: 19 G (20 G)  Catheter at skin depth: 10 cm  Test dose: negative  Assessment  Sensory level: T10  Hemodynamics: stable  Attempts: 1  Additional Notes  Called for labor epidural analgesia request. Medical and Surgical history reviewed with pt. Risks/benefits of epidural discussed including allergic reaction, infection, bleeding, hypotension, headache, back pain, nerve damage, failed or one-sided block. Also discussed anesthesia options and associated risks in the event of . Questions answered. Verbalizes understanding and requests to proceed. FHR:  153  Pt in sitting position. Labor epidural placed using ERICA sterile technique (donned mask and sterile gloves). Back prepped with Chloraprepx2. Sterile drape applied. Site: L3-4  ERICA:   5 cm. Attempts:  1   Re-directs:  0  Site infiltrated with 1%Lidocaine. 17G Tuohy needle inserted, ERICA technique with saline. No heme, CSF, or paresthesias noted. Epidural space dilated with saline. #25ga pencan needle placed through tuohy for dural puncture. +CSF -heme or paresthesia.  Spinal needle removed. Threaded epidural catheter through Tuohy needle easily. Tuohy needle withdrawn. Test Dose: 1732          Negative aspiration. 3cc of 1.5% Lido with epi 1:200,000 test dose given. Negative test dose. Skin:  10cm catheter taped at the skin. Secured with steri strips, tegaderm, and tape. Infusion:  .15% Ropivacaine with Fentanyl (2ug/cc)  Auto bolus 4 ml every 20 minutes. (Max. Dose- 40 ml/hr.)      Sensory Level:  R:  T10 L:  T10    VAS: start 8/10, end 2/10    Patient in supine position with left uterine displacement.  VSS:

## 2020-03-26 NOTE — ANESTHESIA PRE PROCEDURE
Pap smear of cervix     History of colposcopy 2019    Mental disorder     anxiety/depression.  no meds       Past Surgical History:        Procedure Laterality Date    WISDOM TOOTH EXTRACTION         Social History:    Social History     Tobacco Use    Smoking status: Former Smoker     Packs/day: 0.25     Years: 3.00     Pack years: 0.75     Types: Cigarettes     Last attempt to quit: 7/15/2019     Years since quittin.6    Smokeless tobacco: Never Used   Substance Use Topics    Alcohol use: Not Currently                                Counseling given: Not Answered      Vital Signs (Current):   Vitals:    20 1314 20 1316 20 1348 20 1415   BP: 119/63  131/73    Pulse: 100  95    Resp: 17  17 18   Temp:  36.7 °C (98.1 °F)     TempSrc:  Temporal                                                BP Readings from Last 3 Encounters:   20 131/73   20 133/77   20 121/80       NPO Status: Time of last liquid consumption: 725                        Time of last solid consumption: 200                        Date of last liquid consumption: 20                        Date of last solid food consumption: 20    BMI:   Wt Readings from Last 3 Encounters:   20 181 lb (82.1 kg)   20 180 lb (81.6 kg)   20 177 lb 12.8 oz (80.6 kg)     There is no height or weight on file to calculate BMI.    CBC:   Lab Results   Component Value Date    WBC 13.1 2020    RBC 4.01 2020    HGB 11.6 2020    HCT 34.2 2020    MCV 85.2 2020    RDW 13.5 2020     2020       CMP:   Lab Results   Component Value Date     2018    K 3.7 2018     2018    CO2 24 2018    BUN 10 2018    CREATININE 0.6 2018    GFRAA >60 2018    AGRATIO 1.3 2018    LABGLOM >60 2018    GLUCOSE 101 2018    PROT 7.5 2018    CALCIUM 9.7 2018    BILITOT 0.5 2018    ALKPHOS 76 06/07/2018    AST 14 06/07/2018    ALT 10 06/07/2018       POC Tests: No results for input(s): POCGLU, POCNA, POCK, POCCL, POCBUN, POCHEMO, POCHCT in the last 72 hours. Coags:   Lab Results   Component Value Date    PROTIME 10.8 06/07/2018    INR 0.95 06/07/2018    APTT 32.8 06/07/2018       HCG (If Applicable): No results found for: PREGTESTUR, PREGSERUM, HCG, HCGQUANT     ABGs: No results found for: PHART, PO2ART, RXD2VKR, AIN2HFY, BEART, S9BFNEYS     Type & Screen (If Applicable):  No results found for: LABABO, [address]    Anesthesia Evaluation  Patient summary reviewed and Nursing notes reviewed no history of anesthetic complications:   Airway: Mallampati: I  TM distance: >3 FB   Neck ROM: full  Mouth opening: > = 3 FB Dental: normal exam         Pulmonary:normal exam    (+) asthma: seasonal asthma,                            Cardiovascular:Negative CV ROS                      Neuro/Psych:   (+) psychiatric history: stable without treatmentdepression/anxiety             GI/Hepatic/Renal:   (+) GERD:,           Endo/Other: Negative Endo/Other ROS                    Abdominal:           Vascular:                                        Anesthesia Plan      regional and epidural     ASA 2 - emergent     (Patient request epidural for labor and delivery. Discussed procedure and risks including but not limited to changes in VSS, allergic reaction, infection, intravascular injection, paresthesia, n/v, severe headache, temporary or permanent rare neurologic sequelae and failed block. All questions answered. Patient agreed to proceed)        Anesthetic plan and risks discussed with patient. Use of blood products discussed with sibling whom.                    KISHORE Hurst - CRNA   3/26/2020

## 2020-03-27 VITALS — DIASTOLIC BLOOD PRESSURE: 59 MMHG | OXYGEN SATURATION: 100 % | SYSTOLIC BLOOD PRESSURE: 140 MMHG

## 2020-03-27 PROCEDURE — 2580000003 HC RX 258: Performed by: NURSE ANESTHETIST, CERTIFIED REGISTERED

## 2020-03-27 PROCEDURE — 7100000001 HC PACU RECOVERY - ADDTL 15 MIN: Performed by: OBSTETRICS & GYNECOLOGY

## 2020-03-27 PROCEDURE — 1220000000 HC SEMI PRIVATE OB R&B

## 2020-03-27 PROCEDURE — 6360000002 HC RX W HCPCS

## 2020-03-27 PROCEDURE — 2580000003 HC RX 258: Performed by: OBSTETRICS & GYNECOLOGY

## 2020-03-27 PROCEDURE — 6370000000 HC RX 637 (ALT 250 FOR IP): Performed by: OBSTETRICS & GYNECOLOGY

## 2020-03-27 PROCEDURE — 2500000003 HC RX 250 WO HCPCS: Performed by: NURSE ANESTHETIST, CERTIFIED REGISTERED

## 2020-03-27 PROCEDURE — 6360000002 HC RX W HCPCS: Performed by: OBSTETRICS & GYNECOLOGY

## 2020-03-27 PROCEDURE — 2709999900 HC NON-CHARGEABLE SUPPLY: Performed by: OBSTETRICS & GYNECOLOGY

## 2020-03-27 PROCEDURE — 3700000001 HC ADD 15 MINUTES (ANESTHESIA): Performed by: OBSTETRICS & GYNECOLOGY

## 2020-03-27 PROCEDURE — 7100000000 HC PACU RECOVERY - FIRST 15 MIN: Performed by: OBSTETRICS & GYNECOLOGY

## 2020-03-27 PROCEDURE — 6360000002 HC RX W HCPCS: Performed by: NURSE ANESTHETIST, CERTIFIED REGISTERED

## 2020-03-27 PROCEDURE — 2500000003 HC RX 250 WO HCPCS

## 2020-03-27 PROCEDURE — 3700000000 HC ANESTHESIA ATTENDED CARE: Performed by: OBSTETRICS & GYNECOLOGY

## 2020-03-27 PROCEDURE — 3609079900 HC CESAREAN SECTION: Performed by: OBSTETRICS & GYNECOLOGY

## 2020-03-27 PROCEDURE — 6370000000 HC RX 637 (ALT 250 FOR IP)

## 2020-03-27 RX ORDER — METOCLOPRAMIDE HYDROCHLORIDE 5 MG/ML
10 INJECTION INTRAMUSCULAR; INTRAVENOUS ONCE
Status: COMPLETED | OUTPATIENT
Start: 2020-03-27 | End: 2020-03-27

## 2020-03-27 RX ORDER — TRISODIUM CITRATE DIHYDRATE AND CITRIC ACID MONOHYDRATE 500; 334 MG/5ML; MG/5ML
30 SOLUTION ORAL ONCE
Status: COMPLETED | OUTPATIENT
Start: 2020-03-27 | End: 2020-03-27

## 2020-03-27 RX ORDER — ONDANSETRON 2 MG/ML
INJECTION INTRAMUSCULAR; INTRAVENOUS PRN
Status: DISCONTINUED | OUTPATIENT
Start: 2020-03-27 | End: 2020-03-27 | Stop reason: SDUPTHER

## 2020-03-27 RX ORDER — LIDOCAINE HYDROCHLORIDE AND EPINEPHRINE BITARTRATE 20; .01 MG/ML; MG/ML
INJECTION, SOLUTION SUBCUTANEOUS PRN
Status: DISCONTINUED | OUTPATIENT
Start: 2020-03-27 | End: 2020-03-27 | Stop reason: SDUPTHER

## 2020-03-27 RX ORDER — ACETAMINOPHEN 500 MG
1000 TABLET ORAL EVERY 8 HOURS SCHEDULED
Status: DISCONTINUED | OUTPATIENT
Start: 2020-03-27 | End: 2020-03-30 | Stop reason: HOSPADM

## 2020-03-27 RX ORDER — KETOROLAC TROMETHAMINE 30 MG/ML
30 INJECTION, SOLUTION INTRAMUSCULAR; INTRAVENOUS EVERY 8 HOURS
Status: DISCONTINUED | OUTPATIENT
Start: 2020-03-27 | End: 2020-03-30 | Stop reason: HOSPADM

## 2020-03-27 RX ORDER — ACETAMINOPHEN 500 MG
1000 TABLET ORAL ONCE
Status: COMPLETED | OUTPATIENT
Start: 2020-03-27 | End: 2020-03-27

## 2020-03-27 RX ORDER — ACETAMINOPHEN 325 MG/1
650 TABLET ORAL EVERY 4 HOURS PRN
Status: DISCONTINUED | OUTPATIENT
Start: 2020-03-27 | End: 2020-03-30 | Stop reason: HOSPADM

## 2020-03-27 RX ORDER — DEXAMETHASONE SODIUM PHOSPHATE 4 MG/ML
INJECTION, SOLUTION INTRA-ARTICULAR; INTRALESIONAL; INTRAMUSCULAR; INTRAVENOUS; SOFT TISSUE PRN
Status: DISCONTINUED | OUTPATIENT
Start: 2020-03-27 | End: 2020-03-27 | Stop reason: SDUPTHER

## 2020-03-27 RX ORDER — LANOLIN 100 %
OINTMENT (GRAM) TOPICAL
Status: DISCONTINUED | OUTPATIENT
Start: 2020-03-27 | End: 2020-03-30 | Stop reason: HOSPADM

## 2020-03-27 RX ORDER — SODIUM CHLORIDE, SODIUM LACTATE, POTASSIUM CHLORIDE, CALCIUM CHLORIDE 600; 310; 30; 20 MG/100ML; MG/100ML; MG/100ML; MG/100ML
INJECTION, SOLUTION INTRAVENOUS CONTINUOUS
Status: DISCONTINUED | OUTPATIENT
Start: 2020-03-27 | End: 2020-03-30 | Stop reason: HOSPADM

## 2020-03-27 RX ORDER — ACETAMINOPHEN 325 MG/1
650 TABLET ORAL EVERY 4 HOURS PRN
Status: DISCONTINUED | OUTPATIENT
Start: 2020-03-27 | End: 2020-03-27

## 2020-03-27 RX ORDER — SODIUM CHLORIDE 0.9 % (FLUSH) 0.9 %
10 SYRINGE (ML) INJECTION PRN
Status: DISCONTINUED | OUTPATIENT
Start: 2020-03-27 | End: 2020-03-30 | Stop reason: HOSPADM

## 2020-03-27 RX ORDER — ACETAMINOPHEN 325 MG/1
TABLET ORAL
Status: DISCONTINUED
Start: 2020-03-27 | End: 2020-03-27

## 2020-03-27 RX ORDER — ACETAMINOPHEN 500 MG
1000 TABLET ORAL EVERY 6 HOURS PRN
Qty: 30 TABLET | Refills: 0 | Status: ON HOLD | OUTPATIENT
Start: 2020-03-27 | End: 2022-09-27

## 2020-03-27 RX ORDER — DOCUSATE SODIUM 100 MG/1
100 CAPSULE, LIQUID FILLED ORAL 2 TIMES DAILY
Status: DISCONTINUED | OUTPATIENT
Start: 2020-03-27 | End: 2020-03-30 | Stop reason: HOSPADM

## 2020-03-27 RX ORDER — SODIUM CHLORIDE 0.9 % (FLUSH) 0.9 %
10 SYRINGE (ML) INJECTION EVERY 12 HOURS SCHEDULED
Status: DISCONTINUED | OUTPATIENT
Start: 2020-03-27 | End: 2020-03-30 | Stop reason: HOSPADM

## 2020-03-27 RX ORDER — IBUPROFEN 800 MG/1
800 TABLET ORAL EVERY 8 HOURS SCHEDULED
Status: DISCONTINUED | OUTPATIENT
Start: 2020-03-27 | End: 2020-03-30 | Stop reason: HOSPADM

## 2020-03-27 RX ORDER — METOCLOPRAMIDE HYDROCHLORIDE 5 MG/ML
INJECTION INTRAMUSCULAR; INTRAVENOUS
Status: COMPLETED
Start: 2020-03-27 | End: 2020-03-27

## 2020-03-27 RX ORDER — OXYCODONE HYDROCHLORIDE 5 MG/1
5 TABLET ORAL EVERY 4 HOURS PRN
Status: DISCONTINUED | OUTPATIENT
Start: 2020-03-27 | End: 2020-03-30 | Stop reason: HOSPADM

## 2020-03-27 RX ORDER — ACETAMINOPHEN 500 MG
TABLET ORAL
Status: COMPLETED
Start: 2020-03-27 | End: 2020-03-27

## 2020-03-27 RX ORDER — KETOROLAC TROMETHAMINE 30 MG/ML
INJECTION, SOLUTION INTRAMUSCULAR; INTRAVENOUS PRN
Status: DISCONTINUED | OUTPATIENT
Start: 2020-03-27 | End: 2020-03-27 | Stop reason: SDUPTHER

## 2020-03-27 RX ORDER — OXYCODONE HYDROCHLORIDE 5 MG/1
5 TABLET ORAL EVERY 6 HOURS PRN
Qty: 28 TABLET | Refills: 0 | Status: SHIPPED | OUTPATIENT
Start: 2020-03-27 | End: 2020-03-30

## 2020-03-27 RX ORDER — OXYCODONE HYDROCHLORIDE 5 MG/1
10 TABLET ORAL EVERY 4 HOURS PRN
Status: DISCONTINUED | OUTPATIENT
Start: 2020-03-27 | End: 2020-03-30 | Stop reason: HOSPADM

## 2020-03-27 RX ORDER — AZITHROMYCIN 500 MG/1
INJECTION, POWDER, LYOPHILIZED, FOR SOLUTION INTRAVENOUS
Status: DISCONTINUED
Start: 2020-03-27 | End: 2020-03-27

## 2020-03-27 RX ORDER — MORPHINE SULFATE 10 MG/ML
INJECTION, SOLUTION INTRAMUSCULAR; INTRAVENOUS PRN
Status: DISCONTINUED | OUTPATIENT
Start: 2020-03-27 | End: 2020-03-27 | Stop reason: SDUPTHER

## 2020-03-27 RX ORDER — TRISODIUM CITRATE DIHYDRATE AND CITRIC ACID MONOHYDRATE 500; 334 MG/5ML; MG/5ML
SOLUTION ORAL
Status: COMPLETED
Start: 2020-03-27 | End: 2020-03-27

## 2020-03-27 RX ORDER — IBUPROFEN 800 MG/1
800 TABLET ORAL EVERY 8 HOURS PRN
Qty: 30 TABLET | Refills: 0 | Status: ON HOLD | OUTPATIENT
Start: 2020-03-27 | End: 2022-09-27

## 2020-03-27 RX ADMIN — LIDOCAINE HYDROCHLORIDE AND EPINEPHRINE 10 ML: 20; 10 INJECTION, SOLUTION INFILTRATION; PERINEURAL at 13:26

## 2020-03-27 RX ADMIN — SODIUM CITRATE AND CITRIC ACID MONOHYDRATE 30 ML: 500; 334 SOLUTION ORAL at 13:09

## 2020-03-27 RX ADMIN — KETOROLAC TROMETHAMINE 30 MG: 30 INJECTION, SOLUTION INTRAMUSCULAR; INTRAVENOUS at 14:03

## 2020-03-27 RX ADMIN — ACETAMINOPHEN 1000 MG: 500 TABLET ORAL at 13:11

## 2020-03-27 RX ADMIN — MORPHINE SULFATE 3 MG: 10 INJECTION, SOLUTION INTRAMUSCULAR; INTRAVENOUS at 14:05

## 2020-03-27 RX ADMIN — SODIUM CHLORIDE, POTASSIUM CHLORIDE, SODIUM LACTATE AND CALCIUM CHLORIDE: 600; 310; 30; 20 INJECTION, SOLUTION INTRAVENOUS at 07:46

## 2020-03-27 RX ADMIN — DOCUSATE SODIUM 100 MG: 100 CAPSULE ORAL at 22:20

## 2020-03-27 RX ADMIN — METOCLOPRAMIDE HYDROCHLORIDE 10 MG: 5 INJECTION INTRAMUSCULAR; INTRAVENOUS at 13:10

## 2020-03-27 RX ADMIN — LIDOCAINE HYDROCHLORIDE AND EPINEPHRINE 5 ML: 20; 10 INJECTION, SOLUTION INFILTRATION; PERINEURAL at 13:30

## 2020-03-27 RX ADMIN — AZITHROMYCIN MONOHYDRATE 500 MG: 500 INJECTION, POWDER, LYOPHILIZED, FOR SOLUTION INTRAVENOUS at 13:42

## 2020-03-27 RX ADMIN — Medication 1000 MG: at 13:11

## 2020-03-27 RX ADMIN — SODIUM BICARBONATE 1 ML: 0.2 INJECTION, SOLUTION INTRAVENOUS at 13:26

## 2020-03-27 RX ADMIN — FAMOTIDINE 20 MG: 10 INJECTION INTRAVENOUS at 13:10

## 2020-03-27 RX ADMIN — ONDANSETRON 4 MG: 2 INJECTION INTRAMUSCULAR; INTRAVENOUS at 04:20

## 2020-03-27 RX ADMIN — SODIUM BICARBONATE 0.5 ML: 0.2 INJECTION, SOLUTION INTRAVENOUS at 13:30

## 2020-03-27 RX ADMIN — ACETAMINOPHEN 650 MG: 325 TABLET, FILM COATED ORAL at 00:12

## 2020-03-27 RX ADMIN — KETOROLAC TROMETHAMINE 30 MG: 30 INJECTION, SOLUTION INTRAMUSCULAR at 22:16

## 2020-03-27 RX ADMIN — SODIUM CHLORIDE, POTASSIUM CHLORIDE, SODIUM LACTATE AND CALCIUM CHLORIDE: 600; 310; 30; 20 INJECTION, SOLUTION INTRAVENOUS at 13:33

## 2020-03-27 RX ADMIN — SODIUM BICARBONATE 0.5 ML: 0.2 INJECTION, SOLUTION INTRAVENOUS at 13:36

## 2020-03-27 RX ADMIN — OXYTOCIN 600 MILLI-UNITS/MIN: 10 INJECTION, SOLUTION INTRAMUSCULAR; INTRAVENOUS at 13:54

## 2020-03-27 RX ADMIN — TRISODIUM CITRATE DIHYDRATE AND CITRIC ACID MONOHYDRATE 30 ML: 500; 334 SOLUTION ORAL at 13:09

## 2020-03-27 RX ADMIN — BUPIVACAINE HYDROCHLORIDE 5 ML: 2.5 INJECTION, SOLUTION EPIDURAL; INFILTRATION; INTRACAUDAL; PERINEURAL at 06:51

## 2020-03-27 RX ADMIN — DEXAMETHASONE SODIUM PHOSPHATE 8 MG: 4 INJECTION, SOLUTION INTRAMUSCULAR; INTRAVENOUS at 14:03

## 2020-03-27 RX ADMIN — BUPIVACAINE HYDROCHLORIDE 5 ML: 2.5 INJECTION, SOLUTION EPIDURAL; INFILTRATION; INTRACAUDAL; PERINEURAL at 02:47

## 2020-03-27 RX ADMIN — SODIUM CHLORIDE, POTASSIUM CHLORIDE, SODIUM LACTATE AND CALCIUM CHLORIDE: 600; 310; 30; 20 INJECTION, SOLUTION INTRAVENOUS at 16:53

## 2020-03-27 RX ADMIN — CEFAZOLIN 2 G: 10 INJECTION, POWDER, FOR SOLUTION INTRAVENOUS at 13:11

## 2020-03-27 RX ADMIN — ONDANSETRON 4 MG: 2 INJECTION INTRAMUSCULAR; INTRAVENOUS at 13:37

## 2020-03-27 RX ADMIN — LIDOCAINE HYDROCHLORIDE AND EPINEPHRINE 5 ML: 20; 10 INJECTION, SOLUTION INFILTRATION; PERINEURAL at 13:36

## 2020-03-27 ASSESSMENT — PAIN SCALES - GENERAL
PAINLEVEL_OUTOF10: 3
PAINLEVEL_OUTOF10: 4
PAINLEVEL_OUTOF10: 0

## 2020-03-27 ASSESSMENT — PULMONARY FUNCTION TESTS
PIF_VALUE: 0
PIF_VALUE: 0
PIF_VALUE: 1
PIF_VALUE: 0
PIF_VALUE: 1
PIF_VALUE: 0
PIF_VALUE: 1
PIF_VALUE: 0
PIF_VALUE: 0
PIF_VALUE: 1
PIF_VALUE: 0
PIF_VALUE: 1
PIF_VALUE: 0
PIF_VALUE: 1
PIF_VALUE: 1
PIF_VALUE: 0
PIF_VALUE: 1
PIF_VALUE: 0
PIF_VALUE: 1
PIF_VALUE: 0

## 2020-03-27 ASSESSMENT — PAIN DESCRIPTION - DESCRIPTORS
DESCRIPTORS: ACHING;CRAMPING
DESCRIPTORS: SHARP
DESCRIPTORS: CRAMPING
DESCRIPTORS: CRAMPING
DESCRIPTORS: ACHING
DESCRIPTORS: ACHING;CRAMPING

## 2020-03-27 NOTE — FLOWSHEET NOTE
Bedside handoff completed. All questions answered. Orders reviewed. Patient included in discussion regarding plan of care. report given to [employer] RN

## 2020-03-27 NOTE — PROCEDURES
Department of Obstetrics and Gynecology  Obstetrical Operative Report         Pre-operative Diagnosis:  Term Pregnancy, Arrest of Dilation    Post-operative Diagnosis:  Same    Procedure:  primary low transverse  section    Surgeon:   Stephen Mccoy     Assistant(s):       Anesthesia:  Epidural anesthesia    Tubes, uterus, ovaries:  normal    Estimated blood loss:  700ml    Specimens:  none    Complications:  none    Information for the patient's :  Levon Ambriz [[id number]]          Condition:    Infant stable, transfer to General Care Nursery  Mother stable, transfer to labor & delivery room    TECHNIQUE:  The patient was brought to the operating room and following the  placement of adequate Spinal Anesthesia, the patient was placed in dorsal  supine position and wedged to the left. A Jiménez catheter was placed in the  patient's bladder and Juanjo pumps were placed on her lower extremities for  DVT prophylaxis. Following this, patient was prepped and draped in the usual  sterile manner for a  section. A transverse incision was made in the skin with a scalpel. Incision was carried down through fat to the fascia which was incised in the midline. Fascial incision was extended bilaterally  using curved scissors. Fascia was then bluntly and sharply dissected off underlying muscles and then muscle was sharply and bluntly dissected  in the midline exposing the underlying peritoneum which was entered bluntly. The incision was again extended superiorly and inferiorly, taking  care so as not to injure intraabdominal organs or bladder. An Hubert retractor was inserted. A  Low transverse uterine incision was then made with a scalpel. Upon entering the cavity, clear fluid was noted. The incision was extended bilaterally. The infant was then easily delivered from a cephalic presentation. Upon delivery of the infant, nose and mouth were bulb suctioned.   Cord was clamped and cut and the infant was handed off the table  in a routine sterile manner. Cord gases were collected. Cord blood was collected. The placenta was manually removed. The incision was then closed in 1 layers using a 0 - V lock. The incision was reexamined and noted to be hemostatic. Clots were removed. The peritoneum was closed using 3-0 vicryl suture. The Fascia was closed with 0-vicryl  The fat was reapproximated using a running 3-0  Vicryl suture. Skin was reapproximated using subcuticular  4-0 Vicryl  suture. All sponge, needle, instrument and lap were reported as correct at  the end of the procedure. The patient tolerated the procedure well and went  to the recovery room in stable condition.

## 2020-03-27 NOTE — FLOWSHEET NOTE
PT ORIENTED TO ROOM AND CALL LIGHT AND NOT TO GET UP WITHOUT ASSISTANCE OF STAFF,TAKE ONLY SMALL SIPS OF LIQUIDS AND ICE CHIPS, DENIES NEED OR QUESTIONS.

## 2020-03-28 LAB
BASOPHILS ABSOLUTE: 0 K/UL (ref 0–0.2)
BASOPHILS RELATIVE PERCENT: 0.2 %
EOSINOPHILS ABSOLUTE: 0.2 K/UL (ref 0–0.6)
EOSINOPHILS RELATIVE PERCENT: 1.1 %
HCT VFR BLD CALC: 26.3 % (ref 36–48)
HEMOGLOBIN: 9 G/DL (ref 12–16)
LYMPHOCYTES ABSOLUTE: 2.3 K/UL (ref 1–5.1)
LYMPHOCYTES RELATIVE PERCENT: 13.9 %
MCH RBC QN AUTO: 29.4 PG (ref 26–34)
MCHC RBC AUTO-ENTMCNC: 34.2 G/DL (ref 31–36)
MCV RBC AUTO: 85.9 FL (ref 80–100)
MONOCYTES ABSOLUTE: 1 K/UL (ref 0–1.3)
MONOCYTES RELATIVE PERCENT: 6 %
NEUTROPHILS ABSOLUTE: 13.3 K/UL (ref 1.7–7.7)
NEUTROPHILS RELATIVE PERCENT: 78.8 %
PDW BLD-RTO: 13.8 % (ref 12.4–15.4)
PLATELET # BLD: 231 K/UL (ref 135–450)
PMV BLD AUTO: 7.9 FL (ref 5–10.5)
RBC # BLD: 3.06 M/UL (ref 4–5.2)
WBC # BLD: 16.9 K/UL (ref 4–11)

## 2020-03-28 PROCEDURE — 85025 COMPLETE CBC W/AUTO DIFF WBC: CPT

## 2020-03-28 PROCEDURE — 6360000002 HC RX W HCPCS: Performed by: OBSTETRICS & GYNECOLOGY

## 2020-03-28 PROCEDURE — 2580000003 HC RX 258: Performed by: OBSTETRICS & GYNECOLOGY

## 2020-03-28 PROCEDURE — 1220000000 HC SEMI PRIVATE OB R&B

## 2020-03-28 PROCEDURE — 6370000000 HC RX 637 (ALT 250 FOR IP): Performed by: OBSTETRICS & GYNECOLOGY

## 2020-03-28 RX ADMIN — DOCUSATE SODIUM 100 MG: 100 CAPSULE ORAL at 08:19

## 2020-03-28 RX ADMIN — SODIUM CHLORIDE, POTASSIUM CHLORIDE, SODIUM LACTATE AND CALCIUM CHLORIDE: 600; 310; 30; 20 INJECTION, SOLUTION INTRAVENOUS at 00:06

## 2020-03-28 RX ADMIN — DOCUSATE SODIUM 100 MG: 100 CAPSULE ORAL at 20:00

## 2020-03-28 RX ADMIN — OXYCODONE 10 MG: 5 TABLET ORAL at 16:28

## 2020-03-28 RX ADMIN — KETOROLAC TROMETHAMINE 30 MG: 30 INJECTION, SOLUTION INTRAMUSCULAR at 05:37

## 2020-03-28 RX ADMIN — ACETAMINOPHEN 1000 MG: 500 TABLET, FILM COATED ORAL at 01:43

## 2020-03-28 RX ADMIN — Medication 10 ML: at 08:19

## 2020-03-28 RX ADMIN — IBUPROFEN 800 MG: 800 TABLET, FILM COATED ORAL at 13:15

## 2020-03-28 RX ADMIN — ACETAMINOPHEN 1000 MG: 500 TABLET, FILM COATED ORAL at 10:00

## 2020-03-28 RX ADMIN — Medication 10 ML: at 20:01

## 2020-03-28 RX ADMIN — OXYCODONE 10 MG: 5 TABLET ORAL at 20:00

## 2020-03-28 RX ADMIN — ACETAMINOPHEN 1000 MG: 500 TABLET, FILM COATED ORAL at 18:33

## 2020-03-28 RX ADMIN — IBUPROFEN 800 MG: 800 TABLET, FILM COATED ORAL at 21:34

## 2020-03-28 ASSESSMENT — PAIN SCALES - GENERAL
PAINLEVEL_OUTOF10: 7
PAINLEVEL_OUTOF10: 5
PAINLEVEL_OUTOF10: 5
PAINLEVEL_OUTOF10: 2
PAINLEVEL_OUTOF10: 4
PAINLEVEL_OUTOF10: 5
PAINLEVEL_OUTOF10: 7
PAINLEVEL_OUTOF10: 0

## 2020-03-28 NOTE — PLAN OF CARE
Problem: Falls - Risk of:  Goal: Will remain free from falls  Description: Will remain free from falls  3/28/2020 1257 by Taj Summers RN  Outcome: Ongoing     Problem: Falls - Risk of:  Goal: Absence of physical injury  Description: Absence of physical injury  Outcome: Ongoing     Problem: Fluid Volume - Imbalance:  Goal: Absence of imbalanced fluid volume signs and symptoms  Description: Absence of imbalanced fluid volume signs and symptoms   3/28/2020 1257 by Taj Summers RN  Outcome: Ongoing     Problem: Fluid Volume - Imbalance:  Goal: Absence of postpartum hemorrhage signs and symptoms  Description: Absence of postpartum hemorrhage signs and symptoms   3/28/2020 1257 by Taj Summers RN  Outcome: Ongoing     Problem: Pain - Acute:  Goal: Pain level will decrease  Description: Pain level will decrease   3/28/2020 1257 by Taj Summers RN  Outcome: Ongoing

## 2020-03-28 NOTE — ANESTHESIA POSTPROCEDURE EVALUATION
Department of Anesthesiology  Postprocedure Note    Patient: Fernandez Pemberton  MRN: [id number]  YOB: 1995  Date of evaluation: 3/28/2020  Time:  8:12 AM     Procedure Summary     Date:  20 Room / Location:  [facility]    Anesthesia Start:  9774 Anesthesia Stop:  20 1415    Procedures:        SECTION (N/A Abdomen)      Labor Analgesia Diagnosis:  (arrest of dialation)    Surgeon:  Florence Lagunas MD Responsible Provider:  Anita Vincent MD    Anesthesia Type:  regional, epidural ASA Status:  2 - Emergent          Anesthesia Type: regional, epidural    Pamela Phase I: Paemla Score: 9    Pamela Phase II: Pamela Score: 9    Last vitals: Reviewed and per EMR flowsheets. Anesthesia Post Evaluation    Patient location during evaluation: bedside  Patient participation: complete - patient participated  Level of consciousness: awake and alert  Pain score: 4  Airway patency: patent  Nausea & Vomiting: no vomiting and no nausea  Complications: no  Cardiovascular status: hemodynamically stable  Respiratory status: spontaneous ventilation, room air, nonlabored ventilation and acceptable  Hydration status: stable    Patient s/p epidural for L&D and . Pt denies residual numbness post block. Patient is ambulating and voiding without difficulty. Patient denies back pain, headache, paresthesias, n/v or pruritus. Epidural site is free of signs of infection.

## 2020-03-29 PROCEDURE — 6370000000 HC RX 637 (ALT 250 FOR IP): Performed by: OBSTETRICS & GYNECOLOGY

## 2020-03-29 PROCEDURE — 1220000000 HC SEMI PRIVATE OB R&B

## 2020-03-29 RX ADMIN — OXYCODONE 5 MG: 5 TABLET ORAL at 12:01

## 2020-03-29 RX ADMIN — IBUPROFEN 800 MG: 800 TABLET, FILM COATED ORAL at 21:25

## 2020-03-29 RX ADMIN — IBUPROFEN 800 MG: 800 TABLET, FILM COATED ORAL at 05:52

## 2020-03-29 RX ADMIN — OXYCODONE 10 MG: 5 TABLET ORAL at 05:53

## 2020-03-29 RX ADMIN — DOCUSATE SODIUM 100 MG: 100 CAPSULE ORAL at 21:25

## 2020-03-29 RX ADMIN — ACETAMINOPHEN 1000 MG: 500 TABLET, FILM COATED ORAL at 18:00

## 2020-03-29 RX ADMIN — ACETAMINOPHEN 1000 MG: 500 TABLET, FILM COATED ORAL at 10:04

## 2020-03-29 RX ADMIN — DOCUSATE SODIUM 100 MG: 100 CAPSULE ORAL at 08:54

## 2020-03-29 RX ADMIN — IBUPROFEN 800 MG: 800 TABLET, FILM COATED ORAL at 14:30

## 2020-03-29 ASSESSMENT — PAIN SCALES - GENERAL
PAINLEVEL_OUTOF10: 7
PAINLEVEL_OUTOF10: 6
PAINLEVEL_OUTOF10: 8
PAINLEVEL_OUTOF10: 4
PAINLEVEL_OUTOF10: 5
PAINLEVEL_OUTOF10: 4

## 2020-03-29 NOTE — PLAN OF CARE
Problem: Falls - Risk of:  Goal: Will remain free from falls  3/28/2020 2142 by Hans Dela Cruz RN  Outcome: Ongoing  3/28/2020 1257 by Elena Ruiz RN  Outcome: Ongoing  Goal: Absence of physical injury  3/28/2020 2142 by Hans Dela Cruz RN  Outcome: Ongoing  3/28/2020 1257 by Elena Ruiz RN  Outcome: Ongoing     Problem: Fluid Volume - Imbalance:  Goal: Absence of postpartum hemorrhage signs and symptoms  3/28/2020 2142 by Hans Dela Cruz RN  Outcome: Ongoing  3/28/2020 1257 by Elena Ruiz RN  Outcome: Ongoing     Problem: Pain - Acute:  Goal: Pain level will decrease  3/28/2020 2142 by Hans Dela Cruz RN  Outcome: Ongoing  3/28/2020 1257 by Elena Ruiz RN  Outcome: Ongoing  Goal: Able to cope with pain  3/28/2020 2142 by Hans Dela Cruz RN  Outcome: Ongoing  3/28/2020 1257 by Elena Ruiz RN  Outcome: Ongoing     Problem: Pain:  Goal: Pain level will decrease  3/28/2020 2142 by Hans Del aCruz RN  Outcome: Ongoing  3/28/2020 1257 by Elena Ruiz RN  Outcome: Ongoing  Goal: Control of acute pain  3/28/2020 1257 by Elena Ruiz RN  Outcome: Ongoing     Problem: Discharge Planning:  Goal: Discharged to appropriate level of care  Outcome: Ongoing     Problem: Mood - Altered:  Goal: Mood stable  Outcome: Ongoing     Problem: Venous Thromboembolism:  Goal: Will show no signs or symptoms of venous thromboembolism  Outcome: Ongoing  Goal: Absence of signs or symptoms of impaired coagulation  Outcome: Ongoing

## 2020-03-30 VITALS
HEART RATE: 90 BPM | DIASTOLIC BLOOD PRESSURE: 78 MMHG | RESPIRATION RATE: 18 BRPM | TEMPERATURE: 97.6 F | OXYGEN SATURATION: 97 % | SYSTOLIC BLOOD PRESSURE: 130 MMHG

## 2020-03-30 PROCEDURE — 6370000000 HC RX 637 (ALT 250 FOR IP): Performed by: OBSTETRICS & GYNECOLOGY

## 2020-03-30 RX ORDER — FERROUS SULFATE 325(65) MG
325 TABLET ORAL 2 TIMES DAILY
Qty: 60 TABLET | Refills: 1 | Status: SHIPPED | OUTPATIENT
Start: 2020-03-30 | End: 2021-09-30 | Stop reason: ALTCHOICE

## 2020-03-30 RX ADMIN — ACETAMINOPHEN 1000 MG: 500 TABLET, FILM COATED ORAL at 02:20

## 2020-03-30 RX ADMIN — DOCUSATE SODIUM 100 MG: 100 CAPSULE ORAL at 10:37

## 2020-03-30 RX ADMIN — IBUPROFEN 800 MG: 800 TABLET, FILM COATED ORAL at 05:59

## 2020-03-30 RX ADMIN — ACETAMINOPHEN 1000 MG: 500 TABLET, FILM COATED ORAL at 10:37

## 2020-03-30 ASSESSMENT — PAIN SCALES - GENERAL
PAINLEVEL_OUTOF10: 2
PAINLEVEL_OUTOF10: 4
PAINLEVEL_OUTOF10: 5

## 2020-03-30 NOTE — PROGRESS NOTES
S: Pt uncomfortable  O: /70   Pulse 77   Temp 97.9 °F (36.6 °C) (Oral)   Resp 18   LMP 2019 (Exact Date)   SpO2 98%         Ctx - q 2 min       Cx - 9/90/0, OP       Fht - 140's, moderate variability  A/P: 40+1 wks,        Redose epidural. Trial peanut ball. If no cervical change in 2 hours proceed with  section.
Subjective:     Postpartum Day 1:  Delivery    The patient feels well. The patient denies emotional concerns. Pain is well controlled with current medications. The baby iswell. Baby is feeding via breast. Urinary output is adequate. The patient is ambulating well. The patient is tolerating a normal diet. Flatus has been passed. Objective:      Patient Vitals for the past 8 hrs:   BP Temp Temp src Pulse Resp SpO2   20 0831 103/64 98.2 °F (36.8 °C) Oral 103 18 97 %   20 0530 104/68 97.8 °F (36.6 °C) Oral 88 18 97 %     General:    alert, appears stated age and cooperative       Lochia:  appropriate   Uterine Fundus:   firm   Incision:  healing well, no significant drainage, no dehiscence, no significant erythema   DVT Evaluation:  No evidence of DVT seen on physical exam.     Lab Results   Component Value Date    WBC 16.9 (H) 2020    HGB 9.0 (L) 2020    HCT 26.3 (L) 2020    MCV 85.9 2020     2020        Assessment:     Status post  section. Doing well postoperatively. Plan:     Continue current care.
Subjective:     Postpartum Day 3:  Delivery    The patient feels well. The patient denies emotional concerns. Pain is well controlled with current medications. The baby iswell. Urinary output is adequate. The patient is ambulating well. The patient is tolerating a normal diet. Flatus has been passed. Objective:      Patient Vitals for the past 8 hrs:   BP Temp Temp src Pulse Resp   20 1037 130/78 97.6 °F (36.4 °C) Oral 90 18     General:    alert, appears stated age and cooperative   Bowel Sounds:  active   Lochia:  appropriate   Uterine Fundus:   firm   Incision:  healing well, no significant drainage, no dehiscence, no significant erythema   DVT Evaluation:  No evidence of DVT seen on physical exam.     Assessment:     Status post  section. Doing well postoperatively. Plan:     Discharge home with standard precautions and return to clinic in 6-8 weeks.
Intravenous, Continuous, Susanna Gill MD    lanolin ointment, , Topical, Q1H PRN, Susanna Gill MD    Tetanus-Diphth-Acell Pertussis (BOOSTRIX) injection 0.5 mL, 0.5 mL, Intramuscular, Prior to discharge, Susanna Gill MD     Assessment/Plan:      Post Partum: Continue Postpartum care  Encouraged to take some oxycodone to promote movement

## 2020-03-30 NOTE — FLOWSHEET NOTE

## 2020-04-01 ENCOUNTER — FOLLOWUP TELEPHONE ENCOUNTER (OUTPATIENT)
Dept: OBGYN | Age: 25
End: 2020-04-01

## 2020-04-03 ENCOUNTER — FOLLOWUP TELEPHONE ENCOUNTER (OUTPATIENT)
Dept: OBGYN | Age: 25
End: 2020-04-03

## 2020-04-06 ENCOUNTER — FOLLOWUP TELEPHONE ENCOUNTER (OUTPATIENT)
Dept: OBGYN | Age: 25
End: 2020-04-06

## 2020-05-07 ENCOUNTER — POSTPARTUM VISIT (OUTPATIENT)
Dept: OBGYN | Age: 25
End: 2020-05-07
Payer: MEDICAID

## 2020-05-07 PROCEDURE — 87624 HPV HI-RISK TYP POOLED RSLT: CPT

## 2020-05-07 PROCEDURE — 88175 CYTOPATH C/V AUTO FLUID REDO: CPT

## 2020-05-07 PROCEDURE — 99213 OFFICE O/P EST LOW 20 MIN: CPT | Performed by: OBSTETRICS & GYNECOLOGY

## 2020-05-07 NOTE — PROGRESS NOTES
[facility]  Postpartum Visit  Patient Name:  Ck Robles  YOB: 1995      HPI     The patient is a 30 y.o. female   OB History        1    Para   1    Term   1       0    AB   0    Living   1       SAB   0    TAB   0    Ectopic   0    Molar   0    Multiple   0    Live Births   1             who presents for her 6 weeks postpartum visit. Her pregnancy was uncomplicated. Problem list: Abnormal pap  She has no unusual complaints. Delivery:     Procedure:  Primary  section    Surgeon:       OB History    Para Term  AB Living   1 1 1 0 0 1   SAB TAB Ectopic Molar Multiple Live Births   0 0 0 0 0 1      # Outcome Date GA Lbr El/2nd Weight Sex Delivery Anes PTL Lv   1 Term 20 40w1d  9 lb 3 oz (4.167 kg) F CS-LTranv EPI N GRACIELA      Name: Brayden Alexandro: 8  Apgar5: 9       Anesthesia:  epidural anesthesia    Delivery complications:  None    She is currently both breast and bottle    Her vaginal bleeding is none now. She has had a period: No:     She requested contraception. Last Pap: 10/22/2019    ROS     Gastrointestinal: negative  Genitourinary:negative  Behavioral/Psych: negative      EXAM     Vital Signs:  LMP 2019 (Exact Date)      General: Mood is well. Abdomen: soft, nontender, no masses    Pelvic: Vulva WNL, vagina WNL, cervix WNL, uterus WNL, perineum WNL      ASSESSMENT     1. Routine Postpartum visit  2. Contraception counseling - considering IUD  3.  H/O LGSIL pap, repeat done      PLAN     Discharge from Lloyd Scanlon MD  2020

## 2021-09-14 ENCOUNTER — INITIAL PRENATAL (OUTPATIENT)
Dept: OBGYN | Age: 26
End: 2021-09-14
Payer: MEDICAID

## 2021-09-14 VITALS — DIASTOLIC BLOOD PRESSURE: 64 MMHG | SYSTOLIC BLOOD PRESSURE: 115 MMHG

## 2021-09-14 DIAGNOSIS — Z33.1 IUP (INTRAUTERINE PREGNANCY), INCIDENTAL: Primary | ICD-10-CM

## 2021-09-14 LAB — GONADOTROPIN, CHORIONIC (HCG) QUANT: NORMAL MIU/ML

## 2021-09-14 PROCEDURE — 84702 CHORIONIC GONADOTROPIN TEST: CPT

## 2021-09-14 PROCEDURE — 99203 OFFICE O/P NEW LOW 30 MIN: CPT | Performed by: OBSTETRICS & GYNECOLOGY

## 2021-09-14 RX ORDER — MISOPROSTOL 200 UG/1
600 TABLET ORAL ONCE
Qty: 3 TABLET | Refills: 1 | Status: ON HOLD | OUTPATIENT
Start: 2021-09-14 | End: 2021-10-01 | Stop reason: HOSPADM

## 2021-09-14 NOTE — PROGRESS NOTES
LMP 7/10/21. Seen at [facility] 9/14/21 U/S (report on chart) no fetal heart rate possible miscarriage & advised to follow up asap. Patient denies any bleeding, c/o mild cramps Sat/Sun but done today. Reports nausea & breast tenderness. B-hcg drawn & sent. O positive.

## 2021-09-14 NOTE — PROGRESS NOTES
Pt had pelvic US at [facility] on 9/10/21- US c/w MAB. Had B-HCG today- 10496. TV US performed here- no FCA- c/w MAB. Pt denies any VB. Treatment options d/w pt: Cytotec vs D&C. Pt desires Cytotec.

## 2021-09-24 ENCOUNTER — TELEPHONE (OUTPATIENT)
Dept: OBGYN | Age: 26
End: 2021-09-24

## 2021-09-24 NOTE — TELEPHONE ENCOUNTER
Patient called stating she took cytotec & has not had any bleeding. Patient confirmed she has appt 9/30/21 & I recommend keeping that f/up appt.

## 2021-09-30 ENCOUNTER — ROUTINE PRENATAL (OUTPATIENT)
Dept: OBGYN | Age: 26
End: 2021-09-30
Payer: MEDICAID

## 2021-09-30 DIAGNOSIS — O03.9 MISCARRIAGE: Primary | ICD-10-CM

## 2021-09-30 PROCEDURE — 99212 OFFICE O/P EST SF 10 MIN: CPT | Performed by: OBSTETRICS & GYNECOLOGY

## 2021-09-30 RX ORDER — MISOPROSTOL 100 UG/1
200 TABLET ORAL 2 TIMES DAILY
Qty: 8 TABLET | Refills: 3 | Status: ON HOLD | OUTPATIENT
Start: 2021-09-30 | End: 2021-10-01 | Stop reason: HOSPADM

## 2021-09-30 NOTE — PROGRESS NOTES
Here today f/up incomplete AB. States did cytotec pv 9/20/21 & 9/22/21, with very small amount of bleeding one day only 9/20/21. Complains of dull cramping.

## 2021-09-30 NOTE — PROGRESS NOTES
See notes below, took cytotec as prescribed last week, only had scant bleeding, minimal cramps. Exam with closed cervix, options reviewed, desires trial of another course of cytotec.   Rh pos

## 2021-10-01 ENCOUNTER — ANESTHESIA EVENT (OUTPATIENT)
Dept: OPERATING ROOM | Age: 26
End: 2021-10-01
Payer: MEDICAID

## 2021-10-01 ENCOUNTER — HOSPITAL ENCOUNTER (EMERGENCY)
Age: 26
Discharge: HOME | End: 2021-10-01
Payer: MEDICAID

## 2021-10-01 ENCOUNTER — ANESTHESIA (OUTPATIENT)
Dept: OPERATING ROOM | Age: 26
End: 2021-10-01
Payer: MEDICAID

## 2021-10-01 VITALS
OXYGEN SATURATION: 99 % | RESPIRATION RATE: 17 BRPM | BODY MASS INDEX: 27.56 KG/M2 | DIASTOLIC BLOOD PRESSURE: 49 MMHG | SYSTOLIC BLOOD PRESSURE: 104 MMHG | HEART RATE: 98 BPM | TEMPERATURE: 97.2 F | WEIGHT: 175.6 LBS | HEIGHT: 67 IN

## 2021-10-01 VITALS
DIASTOLIC BLOOD PRESSURE: 41 MMHG | SYSTOLIC BLOOD PRESSURE: 85 MMHG | OXYGEN SATURATION: 99 % | TEMPERATURE: 98.6 F | RESPIRATION RATE: 2 BRPM

## 2021-10-01 DIAGNOSIS — N93.9 VAGINAL HEMORRHAGE: Primary | ICD-10-CM

## 2021-10-01 DIAGNOSIS — O03.4 INCOMPLETE ABORTION: ICD-10-CM

## 2021-10-01 LAB
A/G RATIO: 1.5 (ref 1.1–2.2)
ABO/RH: NORMAL
ALBUMIN SERPL-MCNC: 4 G/DL (ref 3.4–5)
ALP BLD-CCNC: 79 U/L (ref 40–129)
ALT SERPL-CCNC: 8 U/L (ref 10–40)
ANION GAP SERPL CALCULATED.3IONS-SCNC: 11 MMOL/L (ref 3–16)
ANTIBODY SCREEN: NORMAL
AST SERPL-CCNC: 11 U/L (ref 15–37)
BASOPHILS ABSOLUTE: 0.1 K/UL (ref 0–0.2)
BASOPHILS ABSOLUTE: 0.1 K/UL (ref 0–0.2)
BASOPHILS RELATIVE PERCENT: 0.5 %
BASOPHILS RELATIVE PERCENT: 0.7 %
BILIRUB SERPL-MCNC: 0.5 MG/DL (ref 0–1)
BUN BLDV-MCNC: 8 MG/DL (ref 7–20)
CALCIUM SERPL-MCNC: 9.2 MG/DL (ref 8.3–10.6)
CHLORIDE BLD-SCNC: 102 MMOL/L (ref 99–110)
CO2: 19 MMOL/L (ref 21–32)
CREAT SERPL-MCNC: <0.5 MG/DL (ref 0.6–1.1)
EOSINOPHILS ABSOLUTE: 0 K/UL (ref 0–0.6)
EOSINOPHILS ABSOLUTE: 0 K/UL (ref 0–0.6)
EOSINOPHILS RELATIVE PERCENT: 0 %
EOSINOPHILS RELATIVE PERCENT: 0.2 %
GFR AFRICAN AMERICAN: >60
GFR NON-AFRICAN AMERICAN: >60
GLOBULIN: 2.7 G/DL
GLUCOSE BLD-MCNC: 147 MG/DL (ref 70–99)
HCT VFR BLD CALC: 25.9 % (ref 36–48)
HCT VFR BLD CALC: 32.4 % (ref 36–48)
HEMOGLOBIN: 10.9 G/DL (ref 12–16)
HEMOGLOBIN: 8.8 G/DL (ref 12–16)
LYMPHOCYTES ABSOLUTE: 0.7 K/UL (ref 1–5.1)
LYMPHOCYTES ABSOLUTE: 1.1 K/UL (ref 1–5.1)
LYMPHOCYTES RELATIVE PERCENT: 5.1 %
LYMPHOCYTES RELATIVE PERCENT: 6.8 %
MCH RBC QN AUTO: 28.4 PG (ref 26–34)
MCH RBC QN AUTO: 28.5 PG (ref 26–34)
MCHC RBC AUTO-ENTMCNC: 33.7 G/DL (ref 31–36)
MCHC RBC AUTO-ENTMCNC: 33.9 G/DL (ref 31–36)
MCV RBC AUTO: 83.9 FL (ref 80–100)
MCV RBC AUTO: 84.4 FL (ref 80–100)
MONOCYTES ABSOLUTE: 0.2 K/UL (ref 0–1.3)
MONOCYTES ABSOLUTE: 0.5 K/UL (ref 0–1.3)
MONOCYTES RELATIVE PERCENT: 1.8 %
MONOCYTES RELATIVE PERCENT: 3.1 %
NEUTROPHILS ABSOLUTE: 12.1 K/UL (ref 1.7–7.7)
NEUTROPHILS ABSOLUTE: 14.4 K/UL (ref 1.7–7.7)
NEUTROPHILS RELATIVE PERCENT: 89.4 %
NEUTROPHILS RELATIVE PERCENT: 92.4 %
PDW BLD-RTO: 13.3 % (ref 12.4–15.4)
PDW BLD-RTO: 13.7 % (ref 12.4–15.4)
PLATELET # BLD: 278 K/UL (ref 135–450)
PLATELET # BLD: 328 K/UL (ref 135–450)
PMV BLD AUTO: 8.1 FL (ref 5–10.5)
PMV BLD AUTO: 8.3 FL (ref 5–10.5)
POTASSIUM REFLEX MAGNESIUM: 4.3 MMOL/L (ref 3.5–5.1)
RBC # BLD: 3.09 M/UL (ref 4–5.2)
RBC # BLD: 3.83 M/UL (ref 4–5.2)
SARS-COV-2, NAAT: NOT DETECTED
SODIUM BLD-SCNC: 132 MMOL/L (ref 136–145)
TOTAL PROTEIN: 6.7 G/DL (ref 6.4–8.2)
WBC # BLD: 13.1 K/UL (ref 4–11)
WBC # BLD: 16.1 K/UL (ref 4–11)

## 2021-10-01 PROCEDURE — 7100000011 HC PHASE II RECOVERY - ADDTL 15 MIN: Performed by: OBSTETRICS & GYNECOLOGY

## 2021-10-01 PROCEDURE — 87635 SARS-COV-2 COVID-19 AMP PRB: CPT

## 2021-10-01 PROCEDURE — 2500000003 HC RX 250 WO HCPCS: Performed by: NURSE ANESTHETIST, CERTIFIED REGISTERED

## 2021-10-01 PROCEDURE — 85025 COMPLETE CBC W/AUTO DIFF WBC: CPT

## 2021-10-01 PROCEDURE — 96374 THER/PROPH/DIAG INJ IV PUSH: CPT

## 2021-10-01 PROCEDURE — 86850 RBC ANTIBODY SCREEN: CPT

## 2021-10-01 PROCEDURE — 2580000003 HC RX 258: Performed by: NURSE ANESTHETIST, CERTIFIED REGISTERED

## 2021-10-01 PROCEDURE — 2709999900 HC NON-CHARGEABLE SUPPLY: Performed by: OBSTETRICS & GYNECOLOGY

## 2021-10-01 PROCEDURE — 3700000000 HC ANESTHESIA ATTENDED CARE: Performed by: OBSTETRICS & GYNECOLOGY

## 2021-10-01 PROCEDURE — 2580000003 HC RX 258: Performed by: PHYSICIAN ASSISTANT

## 2021-10-01 PROCEDURE — 3600000003 HC SURGERY LEVEL 3 BASE: Performed by: OBSTETRICS & GYNECOLOGY

## 2021-10-01 PROCEDURE — 3700000001 HC ADD 15 MINUTES (ANESTHESIA): Performed by: OBSTETRICS & GYNECOLOGY

## 2021-10-01 PROCEDURE — 80053 COMPREHEN METABOLIC PANEL: CPT

## 2021-10-01 PROCEDURE — 86900 BLOOD TYPING SEROLOGIC ABO: CPT

## 2021-10-01 PROCEDURE — 99283 EMERGENCY DEPT VISIT LOW MDM: CPT

## 2021-10-01 PROCEDURE — 86901 BLOOD TYPING SEROLOGIC RH(D): CPT

## 2021-10-01 PROCEDURE — 88305 TISSUE EXAM BY PATHOLOGIST: CPT

## 2021-10-01 PROCEDURE — 3600000013 HC SURGERY LEVEL 3 ADDTL 15MIN: Performed by: OBSTETRICS & GYNECOLOGY

## 2021-10-01 PROCEDURE — 2580000003 HC RX 258: Performed by: OBSTETRICS & GYNECOLOGY

## 2021-10-01 PROCEDURE — 7100000000 HC PACU RECOVERY - FIRST 15 MIN: Performed by: OBSTETRICS & GYNECOLOGY

## 2021-10-01 PROCEDURE — 86923 COMPATIBILITY TEST ELECTRIC: CPT

## 2021-10-01 PROCEDURE — 36415 COLL VENOUS BLD VENIPUNCTURE: CPT

## 2021-10-01 PROCEDURE — 7100000010 HC PHASE II RECOVERY - FIRST 15 MIN: Performed by: OBSTETRICS & GYNECOLOGY

## 2021-10-01 PROCEDURE — 7100000001 HC PACU RECOVERY - ADDTL 15 MIN: Performed by: OBSTETRICS & GYNECOLOGY

## 2021-10-01 PROCEDURE — 6360000002 HC RX W HCPCS: Performed by: NURSE ANESTHETIST, CERTIFIED REGISTERED

## 2021-10-01 RX ORDER — ONDANSETRON 2 MG/ML
INJECTION INTRAMUSCULAR; INTRAVENOUS PRN
Status: DISCONTINUED | OUTPATIENT
Start: 2021-10-01 | End: 2021-10-01 | Stop reason: SDUPTHER

## 2021-10-01 RX ORDER — ONDANSETRON 2 MG/ML
4 INJECTION INTRAMUSCULAR; INTRAVENOUS EVERY 6 HOURS PRN
Status: DISCONTINUED | OUTPATIENT
Start: 2021-10-01 | End: 2021-10-01 | Stop reason: HOSPADM

## 2021-10-01 RX ORDER — HYDROCODONE BITARTRATE AND ACETAMINOPHEN 5; 325 MG/1; MG/1
1 TABLET ORAL EVERY 4 HOURS PRN
Qty: 10 TABLET | Refills: 0 | Status: SHIPPED | OUTPATIENT
Start: 2021-10-01 | End: 2021-10-08

## 2021-10-01 RX ORDER — LIDOCAINE HYDROCHLORIDE 20 MG/ML
INJECTION, SOLUTION EPIDURAL; INFILTRATION; INTRACAUDAL; PERINEURAL PRN
Status: DISCONTINUED | OUTPATIENT
Start: 2021-10-01 | End: 2021-10-01 | Stop reason: SDUPTHER

## 2021-10-01 RX ORDER — FENTANYL CITRATE 50 UG/ML
INJECTION, SOLUTION INTRAMUSCULAR; INTRAVENOUS PRN
Status: DISCONTINUED | OUTPATIENT
Start: 2021-10-01 | End: 2021-10-01 | Stop reason: SDUPTHER

## 2021-10-01 RX ORDER — FENTANYL CITRATE 50 UG/ML
50 INJECTION, SOLUTION INTRAMUSCULAR; INTRAVENOUS EVERY 5 MIN PRN
Status: DISCONTINUED | OUTPATIENT
Start: 2021-10-01 | End: 2021-10-01 | Stop reason: HOSPADM

## 2021-10-01 RX ORDER — 0.9 % SODIUM CHLORIDE 0.9 %
1000 INTRAVENOUS SOLUTION INTRAVENOUS ONCE
Status: COMPLETED | OUTPATIENT
Start: 2021-10-01 | End: 2021-10-01

## 2021-10-01 RX ORDER — DEXAMETHASONE SODIUM PHOSPHATE 4 MG/ML
INJECTION, SOLUTION INTRA-ARTICULAR; INTRALESIONAL; INTRAMUSCULAR; INTRAVENOUS; SOFT TISSUE PRN
Status: DISCONTINUED | OUTPATIENT
Start: 2021-10-01 | End: 2021-10-01 | Stop reason: SDUPTHER

## 2021-10-01 RX ORDER — HYDROMORPHONE HCL 110MG/55ML
0.5 PATIENT CONTROLLED ANALGESIA SYRINGE INTRAVENOUS EVERY 5 MIN PRN
Status: DISCONTINUED | OUTPATIENT
Start: 2021-10-01 | End: 2021-10-01 | Stop reason: HOSPADM

## 2021-10-01 RX ORDER — MIDAZOLAM HYDROCHLORIDE 1 MG/ML
INJECTION INTRAMUSCULAR; INTRAVENOUS PRN
Status: DISCONTINUED | OUTPATIENT
Start: 2021-10-01 | End: 2021-10-01 | Stop reason: SDUPTHER

## 2021-10-01 RX ORDER — HYDROMORPHONE HCL 110MG/55ML
0.25 PATIENT CONTROLLED ANALGESIA SYRINGE INTRAVENOUS EVERY 5 MIN PRN
Status: DISCONTINUED | OUTPATIENT
Start: 2021-10-01 | End: 2021-10-01 | Stop reason: HOSPADM

## 2021-10-01 RX ORDER — SODIUM CHLORIDE 9 MG/ML
INJECTION, SOLUTION INTRAVENOUS CONTINUOUS PRN
Status: DISCONTINUED | OUTPATIENT
Start: 2021-10-01 | End: 2021-10-01 | Stop reason: SDUPTHER

## 2021-10-01 RX ORDER — OXYCODONE HYDROCHLORIDE 5 MG/1
5 TABLET ORAL PRN
Status: DISCONTINUED | OUTPATIENT
Start: 2021-10-01 | End: 2021-10-01 | Stop reason: HOSPADM

## 2021-10-01 RX ORDER — DIPHENHYDRAMINE HYDROCHLORIDE 50 MG/ML
12.5 INJECTION INTRAMUSCULAR; INTRAVENOUS
Status: DISCONTINUED | OUTPATIENT
Start: 2021-10-01 | End: 2021-10-01 | Stop reason: HOSPADM

## 2021-10-01 RX ORDER — SODIUM CHLORIDE 9 MG/ML
INJECTION, SOLUTION INTRAVENOUS PRN
Status: DISCONTINUED | OUTPATIENT
Start: 2021-10-01 | End: 2021-10-01 | Stop reason: HOSPADM

## 2021-10-01 RX ORDER — ALBUMIN, HUMAN INJ 5% 5 %
12.5 SOLUTION INTRAVENOUS ONCE
Status: DISCONTINUED | OUTPATIENT
Start: 2021-10-01 | End: 2021-10-01 | Stop reason: HOSPADM

## 2021-10-01 RX ORDER — PHENYLEPHRINE HCL IN 0.9% NACL 1 MG/10 ML
SYRINGE (ML) INTRAVENOUS PRN
Status: DISCONTINUED | OUTPATIENT
Start: 2021-10-01 | End: 2021-10-01 | Stop reason: SDUPTHER

## 2021-10-01 RX ORDER — LABETALOL HYDROCHLORIDE 5 MG/ML
5 INJECTION, SOLUTION INTRAVENOUS EVERY 10 MIN PRN
Status: DISCONTINUED | OUTPATIENT
Start: 2021-10-01 | End: 2021-10-01 | Stop reason: HOSPADM

## 2021-10-01 RX ORDER — KETOROLAC TROMETHAMINE 30 MG/ML
INJECTION, SOLUTION INTRAMUSCULAR; INTRAVENOUS PRN
Status: DISCONTINUED | OUTPATIENT
Start: 2021-10-01 | End: 2021-10-01 | Stop reason: SDUPTHER

## 2021-10-01 RX ORDER — PROPOFOL 10 MG/ML
INJECTION, EMULSION INTRAVENOUS PRN
Status: DISCONTINUED | OUTPATIENT
Start: 2021-10-01 | End: 2021-10-01 | Stop reason: SDUPTHER

## 2021-10-01 RX ORDER — MAGNESIUM HYDROXIDE 1200 MG/15ML
LIQUID ORAL CONTINUOUS PRN
Status: COMPLETED | OUTPATIENT
Start: 2021-10-01 | End: 2021-10-01

## 2021-10-01 RX ORDER — OXYTOCIN 10 [USP'U]/ML
INJECTION, SOLUTION INTRAMUSCULAR; INTRAVENOUS PRN
Status: DISCONTINUED | OUTPATIENT
Start: 2021-10-01 | End: 2021-10-01 | Stop reason: SDUPTHER

## 2021-10-01 RX ORDER — OXYCODONE HYDROCHLORIDE 5 MG/1
10 TABLET ORAL PRN
Status: DISCONTINUED | OUTPATIENT
Start: 2021-10-01 | End: 2021-10-01 | Stop reason: HOSPADM

## 2021-10-01 RX ADMIN — SODIUM CHLORIDE: 9 INJECTION, SOLUTION INTRAVENOUS at 18:02

## 2021-10-01 RX ADMIN — KETOROLAC TROMETHAMINE 30 MG: 60 INJECTION, SOLUTION INTRAMUSCULAR at 18:25

## 2021-10-01 RX ADMIN — LIDOCAINE HYDROCHLORIDE 60 MG: 20 INJECTION, SOLUTION EPIDURAL; INFILTRATION; INTRACAUDAL; PERINEURAL at 18:06

## 2021-10-01 RX ADMIN — SODIUM CHLORIDE 1000 ML: 9 INJECTION, SOLUTION INTRAVENOUS at 17:30

## 2021-10-01 RX ADMIN — ONDANSETRON 4 MG: 2 INJECTION INTRAMUSCULAR; INTRAVENOUS at 18:10

## 2021-10-01 RX ADMIN — Medication 200 MCG: at 18:10

## 2021-10-01 RX ADMIN — FENTANYL CITRATE 50 MCG: 50 INJECTION, SOLUTION INTRAMUSCULAR; INTRAVENOUS at 18:06

## 2021-10-01 RX ADMIN — PROPOFOL 180 MG: 10 INJECTION, EMULSION INTRAVENOUS at 18:06

## 2021-10-01 RX ADMIN — DEXAMETHASONE SODIUM PHOSPHATE 4 MG: 4 INJECTION, SOLUTION INTRAMUSCULAR; INTRAVENOUS at 18:10

## 2021-10-01 RX ADMIN — SODIUM CHLORIDE 1000 ML: 9 INJECTION, SOLUTION INTRAVENOUS at 13:30

## 2021-10-01 RX ADMIN — MIDAZOLAM 2 MG: 1 INJECTION INTRAMUSCULAR; INTRAVENOUS at 18:02

## 2021-10-01 RX ADMIN — Medication 200 MCG: at 18:14

## 2021-10-01 RX ADMIN — Medication 300 MCG: at 18:37

## 2021-10-01 RX ADMIN — OXYTOCIN 40 UNITS: 10 INJECTION, SOLUTION INTRAMUSCULAR; INTRAVENOUS at 18:19

## 2021-10-01 ASSESSMENT — PULMONARY FUNCTION TESTS
PIF_VALUE: 1
PIF_VALUE: 3
PIF_VALUE: 10
PIF_VALUE: 10
PIF_VALUE: 0
PIF_VALUE: 3
PIF_VALUE: 3
PIF_VALUE: 0
PIF_VALUE: 2
PIF_VALUE: 2
PIF_VALUE: 10
PIF_VALUE: 11
PIF_VALUE: 11
PIF_VALUE: 10
PIF_VALUE: 10
PIF_VALUE: 3
PIF_VALUE: 1
PIF_VALUE: 2
PIF_VALUE: 10
PIF_VALUE: 3
PIF_VALUE: 0
PIF_VALUE: 0
PIF_VALUE: 2
PIF_VALUE: 3
PIF_VALUE: 10
PIF_VALUE: 3
PIF_VALUE: 11

## 2021-10-01 ASSESSMENT — ENCOUNTER SYMPTOMS
CONSTIPATION: 0
SHORTNESS OF BREATH: 0
BACK PAIN: 0
COLOR CHANGE: 0
VOMITING: 0
NAUSEA: 0
COUGH: 0
ABDOMINAL PAIN: 1
DIARRHEA: 0

## 2021-10-01 ASSESSMENT — PAIN DESCRIPTION - LOCATION: LOCATION: ABDOMEN

## 2021-10-01 ASSESSMENT — PAIN SCALES - GENERAL: PAINLEVEL_OUTOF10: 8

## 2021-10-01 NOTE — CARE COORDINATION
Patient was not scheduled for New patient/ED Follow-Up appointment with [facility] today. Ms. Daya S, [facility] is being admitted. I am unable to schedule a follow up appointment for her. Both the [facility] Physician Referral Line ([phone]) and the [address] [place] [address] Department phone number ([phone]) will be provided to patient for any further questions/ concerns.

## 2021-10-01 NOTE — PROGRESS NOTES
Pt resting quietly in bed with eyes closed, awakens to voice, denies pain. VSS, O2 sats 99% on room air. Ct-pad in place, dry. Pt seen by anesthesia, phase 1 1 criteria met. Will discharge pt from PACU.

## 2021-10-01 NOTE — CONSULTS
Department of Gynecology  Consult Note      Reason for Consult:  Vaginal Bleeding with missed AB  Requesting Physician:  ER Physician    CHIEF COMPLAINT:   Vaginal bleeding    History obtained from patient, electronic medical record    HISTORY OF PRESENT ILLNESS:     The patient is a 27 y.o. female with significant past medical history of a missed AB who presents with vaginal bleeding after taking cytotec for medical management of her MAB. The patient took a dose last night and started with some bleeding and then took a second dose at 9:00 am this morning when the bleeding began to increase along with her cramping. She presented to the ER due to the increase in bleeding and pain. Past Medical History:        Diagnosis Date    Abnormal Pap smear of cervix     History of colposcopy 2019    Mental disorder     anxiety/depression. no meds     Past Surgical History:        Procedure Laterality Date     SECTION N/A 3/27/2020     SECTION performed by Allyn Gilbert MD at [facility] L&D OR    WISDOM TOOTH EXTRACTION         Past Gynecological History:    1. Last menstrual period:    2. Menses:   3. Contraception:   4. Sexually transmitted disease history:         A.  Number of sexual partners in the last 6 months:     meds:  Current Facility-Administered Medications:     0.9 % sodium chloride bolus, 1,000 mL, IntraVENous, Once, AD Bowling, Last Rate: 999 mL/hr at 10/01/21 1330, 1,000 mL at 10/01/21 1330    Current Outpatient Medications:     miSOPROStol (CYTOTEC) 100 MCG tablet, Take 2 tablets by mouth 2 times daily for 1 day, Disp: 8 tablet, Rfl: 3    ibuprofen (ADVIL;MOTRIN) 800 MG tablet, Take 1 tablet by mouth every 8 hours as needed for Pain, Disp: 30 tablet, Rfl: 0    miSOPROStol (CYTOTEC) 200 MCG tablet, Place 3 tablets vaginally once for 1 dose, Disp: 3 tablet, Rfl: 1    acetaminophen (APAP EXTRA STRENGTH) 500 MG tablet, Take 2 tablets by mouth every 6 hours as needed for Pain weeks ago, previous cytotec placement approximately 10 days ago with no passage of products. Treatment repeated last night with increase in bleeding. Tissue removed from cervical os with a ring forcep, pain improved and bleeding slowed currently. 2. Will monitor bleeding and symptoms for the next 30 minutes to an hour, if products do not complete pass or bleeding increases significantly then would plan to take to the OR for a D&C.

## 2021-10-01 NOTE — PROGRESS NOTES
Pt arrived from OR to PACU, awakens to voice, denies pain. Pt hypotensive on arrival to PACU, order from Dr. Vanesa Plaza for 250 mL 5% albumin. O2 sats 100% on 6 L simple mask. Will monitor.

## 2021-10-01 NOTE — ANESTHESIA PRE PROCEDURE
Department of Anesthesiology  Preprocedure Note       Name:  Rodrigo Emmanuel   Age:  27 y.o.  :  1995                                          MRN:  [id number]         Date:  10/1/2021      Surgeon: Dony Reyes):  Amada Kerns MD    Procedure: Procedure(s):  DILATATION AND CURETTAGE SUCTION    Medications prior to admission:   Prior to Admission medications    Medication Sig Start Date End Date Taking?  Authorizing Provider   miSOPROStol (CYTOTEC) 100 MCG tablet Take 2 tablets by mouth 2 times daily for 1 day 9/30/21 10/1/21 Yes Yang BALLARD MD   ibuprofen (ADVIL;MOTRIN) 800 MG tablet Take 1 tablet by mouth every 8 hours as needed for Pain 3/27/20  Yes Roxane Mckeon MD   miSOPROStol (CYTOTEC) 200 MCG tablet Place 3 tablets vaginally once for 1 dose 21  Kelsy Moreno MD   acetaminophen (APAP EXTRA STRENGTH) 500 MG tablet Take 2 tablets by mouth every 6 hours as needed for Pain  Patient not taking: Reported on 2021 3/27/20   Roxane Mckeon MD       Current medications:    Current Facility-Administered Medications   Medication Dose Route Frequency Provider Last Rate Last Admin    ondansetron [facility]) injection 4 mg  4 mg IntraVENous Q6H PRN Han Harmon MD        0.9 % sodium chloride infusion   IntraVENous PRN AD Bowling        0.9 % sodium chloride bolus  1,000 mL IntraVENous Once AD Bowling 1,000 mL/hr at 10/01/21 1730 1,000 mL at 10/01/21 1730    HYDROmorphone (DILAUDID) injection 0.25 mg  0.25 mg IntraVENous Q5 Min PRN Cory Andersen MD        fentaNYL (SUBLIMAZE) injection 50 mcg  50 mcg IntraVENous Q5 Min PRN Cory Andersen MD        HYDROmorphone (DILAUDID) injection 0.25 mg  0.25 mg IntraVENous Q5 Min PRN Cory Andersen MD        HYDROmorphone (DILAUDID) injection 0.5 mg  0.5 mg IntraVENous Q5 Min PRN Cory Andersen MD        oxyCODONE (ROXICODONE) immediate release tablet 5 mg  5 mg Oral PRN Cory Andersen MD        Or   [facility] oxyCODONE (ROXICODONE) immediate release tablet 10 mg  10 mg Oral PRN Isabel Ramirez MD        diphenhydrAMINE (BENADRYL) injection 12.5 mg  12.5 mg IntraVENous Once PRN Isabel Ramirez MD        labetalol (NORMODYNE;TRANDATE) injection 5 mg  5 mg IntraVENous Q10 Min PRN Isabel Ramirez MD           Allergies:  No Known Allergies    Problem List:    Patient Active Problem List   Diagnosis Code    Risk of  labor O09.219    LGSIL on Pap smear of cervix R87.612    Incomplete  O03.4       Past Medical History:        Diagnosis Date    Abnormal Pap smear of cervix     History of colposcopy 2019    Mental disorder     anxiety/depression.  no meds       Past Surgical History:        Procedure Laterality Date     SECTION N/A 3/27/2020     SECTION performed by Debby Montero MD at [address] L&D OR    WISDOM TOOTH EXTRACTION         Social History:    Social History     Tobacco Use    Smoking status: Former Smoker     Packs/day: 0.25     Years: 3.00     Pack years: 0.75     Types: Cigarettes     Quit date: 7/15/2019     Years since quittin.2    Smokeless tobacco: Never Used   Substance Use Topics    Alcohol use: Not Currently                                Counseling given: Not Answered      Vital Signs (Current):   Vitals:    10/01/21 1700 10/01/21 1713 10/01/21 1715 10/01/21 1740   BP: 84/65 97/64 112/74 112/74   Pulse: 106 126 117 119   Resp:    Temp:    97.3 °F (36.3 °C)   TempSrc:    Temporal   SpO2: 100% 100% 100% 100%   Weight:       Height:                                                  BP Readings from Last 3 Encounters:   10/01/21 112/74   21 115/64   20 130/78       NPO Status: Time of last liquid consumption: 1100                        Time of last solid consumption: 1100                        Date of last liquid consumption: 10/01/21                        Date of last solid food consumption: 10/01/21    BMI:   Wt Readings from Last 3 Encounters:   10/01/21 175 lb 9.6 oz (79.7 kg)   03/19/20 181 lb (82.1 kg)   03/12/20 180 lb (81.6 kg)     Body mass index is 27.5 kg/m². CBC:   Lab Results   Component Value Date    WBC 13.1 10/01/2021    RBC 3.09 10/01/2021    HGB 8.8 10/01/2021    HCT 25.9 10/01/2021    MCV 83.9 10/01/2021    RDW 13.3 10/01/2021     10/01/2021       CMP:   Lab Results   Component Value Date     10/01/2021    K 4.3 10/01/2021     10/01/2021    CO2 19 10/01/2021    BUN 8 10/01/2021    CREATININE <0.5 10/01/2021    GFRAA >60 10/01/2021    AGRATIO 1.5 10/01/2021    LABGLOM >60 10/01/2021    GLUCOSE 147 10/01/2021    PROT 6.7 10/01/2021    CALCIUM 9.2 10/01/2021    BILITOT 0.5 10/01/2021    ALKPHOS 79 10/01/2021    AST 11 10/01/2021    ALT 8 10/01/2021       POC Tests: No results for input(s): POCGLU, POCNA, POCK, POCCL, POCBUN, POCHEMO, POCHCT in the last 72 hours. Coags:   Lab Results   Component Value Date    PROTIME 10.8 06/07/2018    INR 0.95 06/07/2018    APTT 32.8 06/07/2018       HCG (If Applicable): No results found for: PREGTESTUR, PREGSERUM, HCG, HCGQUANT     ABGs: No results found for: PHART, PO2ART, POH7SNF, OJG6OGJ, BEART, N6OICCGN     Type & Screen (If Applicable):  No results found for: LABABO, LABRH    Drug/Infectious Status (If Applicable):  No results found for: HIV, HEPCAB    COVID-19 Screening (If Applicable):   Lab Results   Component Value Date    COVID19 Not Detected 10/01/2021           Anesthesia Evaluation    Airway: Mallampati: II  TM distance: >3 FB   Neck ROM: full  Mouth opening: > = 3 FB Dental:          Pulmonary:                              Cardiovascular:            Rhythm: regular  Rate: normal                    Neuro/Psych:   (+) psychiatric history:            GI/Hepatic/Renal:             Endo/Other:                     Abdominal:             Vascular:           Other Findings:             Anesthesia Plan      general     ASA 1 - emergent       Induction: intravenous. Anesthetic plan and risks discussed with patient. Plan discussed with CRNA.                   Richard Jauregui MD   10/1/2021

## 2021-10-01 NOTE — H&P
See Consult note from same date for initial assessment and presentation. Patient continues to bleed vaginally and now has increased pain as well. Will CBC now and plan to take the OR for suction D&C. Risks discussed and informed consent obtained. Will get a rapid covid test now. NPO since 11:00 am. Discussed a transfusion if Hg drops markedly from admission. OR to call when ready for the patient.
Price (Do Not Change): 0.00
Instructions: This plan will send the code FBSE to the PM system.  DO NOT or CHANGE the price.
Detail Level: Generalized

## 2021-10-01 NOTE — ED PROVIDER NOTES
**EVALUATED BY KADEEM**    [facility] OR  eMERGENCY dEPARTMENT eNCOUnter    Pt Name: Claude Cormier  MRN: [id number]  Jose 1995  Date of evaluation: 10/1/2021  Provider: AD Bashir    Chief Complaint:    Chief Complaint   Patient presents with    Vaginal Bleeding     Pt reports medication induced miscarriage, took medication this morning, and then began having heavy vaginal bleeding and feeling light headed. Nursing Notes, Past Medical Hx, Past Surgical Hx, Social Hx, Allergies, and Family Hx were all reviewed and agreedwith or any disagreements were addressed in the HPI.    HPI:  (Location, Duration, Timing, Severity, Quality, Assoc Sx, Context, Modifying factors)  This is a  27 y.o. female who presents to the emergency department with complaints of vaginal bleeding. Patient states that she had a missed miscarriage at 9 weeks pregnant. Patient given Cytotec on  and . She did take her 1st dose of Cytotec initially without resolution of miscarriage. Patient saw Dr. Loyd Quiet yesterday for incomplete AB and prescribed Cytotec again, had 1st dose at 12 PM and 2nd dose around 9 AM.  She states that the bleeding was fairly significant after the 2nd dose and she has not been able to get off the toilet because of how much bleeding she has been having. Patient started to feel lightheaded and decided to come to the emergency department. She complains of lower abdominal cramping, 8 out of 10 in severity. Denies any associated nausea vomiting diarrhea or constipation. Denies fevers or chills. RH +  All other systems were reviewed and are negative. Past Medical/Surgical History:      Diagnosis Date    Abnormal Pap smear of cervix     History of colposcopy 2019    Mental disorder     anxiety/depression.  no meds         Procedure Laterality Date     SECTION N/A 3/27/2020     SECTION performed by Debby [facility]tero [facility] at [facility] L[facility]D OR    WISDOM TOOTH EXTRACTION Medications:  Current Discharge Medication List      CONTINUE these medications which have NOT CHANGED    Details   miSOPROStol (CYTOTEC) 100 MCG tablet Take 2 tablets by mouth 2 times daily for 1 day  Qty: 8 tablet, Refills: 3    Comments: Use 800 mcg  PO and repeat in 8 hours      ibuprofen (ADVIL;MOTRIN) 800 MG tablet Take 1 tablet by mouth every 8 hours as needed for Pain  Qty: 30 tablet, Refills: 0      acetaminophen (APAP EXTRA STRENGTH) 500 MG tablet Take 2 tablets by mouth every 6 hours as needed for Pain  Qty: 30 tablet, Refills: 0               Review of Systems:  Review of Systems   Constitutional: Negative for chills and fever. Respiratory: Negative for cough and shortness of breath. Cardiovascular: Negative for chest pain. Gastrointestinal: Positive for abdominal pain. Negative for constipation, diarrhea, nausea and vomiting. Genitourinary: Positive for menstrual problem and vaginal bleeding. Negative for decreased urine volume, difficulty urinating, dysuria, flank pain, frequency, genital sores, hematuria, urgency and vaginal pain. Musculoskeletal: Negative for back pain and neck pain. Skin: Negative for color change and rash. All other systems reviewed and are negative. Positives and Pertinent negatives as per HPI. Except as noted above in the ROS, all other systems were reviewed/completed and are negative. Physical Exam:  Physical Exam  Vitals and nursing note reviewed. Exam conducted with a chaperone present. Constitutional:       Appearance: Normal appearance. She is well-developed. She is not toxic-appearing or diaphoretic. HENT:      Head: Normocephalic. Right Ear: External ear normal.      Left Ear: External ear normal.      Nose: Nose normal.   Eyes:      General:         Right eye: No discharge. Left eye: No discharge. Conjunctiva/sclera: Conjunctivae normal.   Cardiovascular:      Rate and Rhythm: Normal rate and regular rhythm.       Heart sounds: Normal heart sounds. No murmur heard. No friction rub. No gallop. Pulmonary:      Effort: Pulmonary effort is normal. No respiratory distress. Breath sounds: Normal breath sounds. No wheezing or rales. Abdominal:      General: Abdomen is flat. Bowel sounds are normal. There is no distension. Palpations: Abdomen is soft. There is no mass. Tenderness: There is no abdominal tenderness. There is no guarding. Genitourinary:     Pubic Area: No rash or pubic lice. Labia:         Right: No rash, tenderness or lesion. Left: No rash, tenderness or lesion. Vagina: Normal.      Comments: Cervical os dilated, significant amount of vaginal bleeding, clots  Musculoskeletal:         General: Normal range of motion. Cervical back: Normal range of motion and neck supple. Skin:     General: Skin is warm and dry. Coloration: Skin is not pale. Neurological:      Mental Status: She is alert and oriented to person, place, and time. Psychiatric:         Behavior: Behavior normal. Behavior is cooperative.          MEDICAL DECISION MAKING    Vitals:    Vitals:    10/01/21 1700 10/01/21 1713 10/01/21 1715 10/01/21 1740   BP: 84/65 97/64 112/74 112/74   Pulse: 106 126 117 119   Resp: 21 18 20 20   Temp:    97.3 °F (36.3 °C)   TempSrc:    Temporal   SpO2: 100% 100% 100% 100%   Weight:       Height:           LABS:   Labs Reviewed   CBC WITH AUTO DIFFERENTIAL - Abnormal; Notable for the following components:       Result Value    WBC 16.1 (*)     RBC 3.83 (*)     Hemoglobin 10.9 (*)     Hematocrit 32.4 (*)     Neutrophils Absolute 14.4 (*)     All other components within normal limits    Narrative:     Performed at:  [facility] [address], [place], [address]   Phone [phone]   COMPREHENSIVE METABOLIC PANEL W/ REFLEX TO MG FOR LOW K - Abnormal; Notable for the following components:    Sodium 132 (*)     CO2 19 (*)     Glucose 147 (*) CREATININE <0.5 (*)     ALT 8 (*)     AST 11 (*)     All other components within normal limits    Narrative:     Performed at:  [facility] [address] [facility], [facility]   Phone [phone]   CBC WITH AUTO DIFFERENTIAL - Abnormal; Notable for the following components:    WBC 13.1 (*)     RBC 3.09 (*)     Hemoglobin 8.8 (*)     Hematocrit 25.9 (*)     Neutrophils Absolute 12.1 (*)     Lymphocytes Absolute 0.7 (*)     All other components within normal limits    Narrative:     Performed at:  [facility] [address], [employer]   Phone [phone], RAPID    Narrative:     Performed at:  [facility] [address],  [place], [facility]   Phone [phone]   TYPE AND SCREEN    Narrative:     Performed at:  [facility] [address], [place], [address] [facility] [address]   Phone [phone]   PREPARE RBC (CROSSMATCH)        Remainder of labs reviewed and were negative at this time or not returned at the time of this note. RADIOLOGY:   Non-plain film images suchas CT, Ultrasound and MRI are read by the radiologist. Kaylyn PRINCE PA have directly visualized the radiologic plain film image(s) with the below findings:  Interpretation per the Radiologist below, if available at the time of this note:    No orders to display     [address] [place] [address] / ED COURSE:      PROCEDURES:   Procedures    Patient was given:  Medications   ondansetron (ZOFRAN) injection 4 mg (has no administration in time range)   0.9 % sodium chloride infusion (has no administration in time range)   0.9 % sodium chloride bolus (1,000 mLs IntraVENous New Bag 10/1/21 1730)   0.9 % sodium chloride bolus (0 mLs IntraVENous Stopped 10/1/21 1430)     Patient presents to the emergency department with heavy vaginal bleeding.   Patient is given initial liter of fluids as she is tachycardic, blood pressure however initially is stable. Potassium at initial evaluation is 111/71. Heart rate is 106. Patient is given IV fluids and Zofran as she is feeling nauseated. She does appear very pale. Vaginal exam was performed with nurse at bedside and she does have a significant amount of bleeding and clots removed from the vaginal canal.    I spoke with on-call OB/GYN doctor, Dr. Herve Huntley who was evaluated patient in the emergency department, patient will go to the OR for D&C. As patient is waiting to go to the OR for her D&C blood pressure has started to drop and systolically in the 07M, 2nd liter of IV fluid has been started, I did also order a unit of blood. I spoke with Dr. Andrew Portillo as her hemoglobin is also dropped to 8.8. She has requested that patient not receive blood as she is going straight to the OR. Patient updated on plan of care and is agreeable. Stable at time of transfer, blood pressure 112/74 and pulse of 117. The patient tolerated their visit well. I have evaluated the patient with physician available for consultation as needed. I have discussed the findings of today's workup with the patient and addressed the patient's questions and concerns. CLINICAL IMPRESSION:  1. Vaginal hemorrhage        DISPOSITION Decision To Admit 10/01/2021 05:07:58 PM      PATIENT REFERRED TO:  No follow-up provider specified.     DISCHARGE MEDICATIONS:  Current Discharge Medication List                 (Please note the MDM and HPI sections of this note were completed with avoice recognition program.  Efforts were made to edit the dictations but occasionally words are mis-transcribed.)    Electronically signed, AD Layne,             AD Bowling  10/01/21 [id number]

## 2021-10-01 NOTE — PROCEDURES
Department of Obstetrics and Gynecology   Operative Report        Pre-operative Diagnosis: Incomplete , hemorrhage    Post-operative Diagnosis:  Same    Procedure: Suction evacuation curettage    Surgeon:  Dr Benjy Braun MD     Assistant(s):  None     Anesthesia:  general    Findings:  Products of conception      Estimated blood loss: Minimal in the OR    Specimens:  products of conception    Complications:  none    Condition:  Good    Details of the procedure: The patient was taken to the OR and general anesthesia was administered. She was prepped and draped in the normal sterile fashion. Retractors were inserted into her vaginal.  A single tooth tenaculum was placed on the anterior lip of the cervix. The os was opened about 1.5 cm with POC in canal. A 12 Urdu suction cannula was inserted into the cervix and suction was applied with return of products of conception. Suction was continued until there was not any more return of tissue. A sharp currett was  then used until a gritty texture was felt on the uterus. The suction curette was then placed into the uterus again. The instruments were removed from the vagina. Counts were correct. She was taken to the recovery room in good condition. Her blood type is RH positive .   She will receive Rhogam No

## 2021-10-01 NOTE — ANESTHESIA POSTPROCEDURE EVALUATION
Department of Anesthesiology  Postprocedure Note    Patient: Chan Rocha  MRN: [id number]  YOB: 1995  Date of evaluation: 10/1/2021  Time:  6:56 PM     Procedure Summary     Date: 10/01/21 Room / Location: [address]    Anesthesia Start:  Anesthesia Stop: 0106    Procedure: DILATATION AND CURETTAGE SUCTION (N/A ) Diagnosis: (Incomplete )    Surgeons: Aaron Leo MD Responsible Provider: Lona Calzada MD    Anesthesia Type: general ASA Status: 1 - Emergent          Anesthesia Type: general    Pamela Phase I: Pamela Score: 7    Pamela Phase II:      Last vitals: Reviewed and per EMR flowsheets.        Anesthesia Post Evaluation    Level of consciousness: awake  Complications: no

## 2021-10-02 NOTE — PROGRESS NOTES
Discharge instructions and new medication prescription reviewed with pt, verbalized understanding. Pt safely dressed and transferred self to wheelchair, IV removed without complications. Pt discharged in wheelchair with all belongings to car by this RN, pt tolerated well.

## 2021-10-05 LAB
BLOOD BANK DISPENSE STATUS: NORMAL
BLOOD BANK DISPENSE STATUS: NORMAL
BLOOD BANK PRODUCT CODE: NORMAL
BLOOD BANK PRODUCT CODE: NORMAL
BPU ID: NORMAL
BPU ID: NORMAL
DESCRIPTION BLOOD BANK: NORMAL
DESCRIPTION BLOOD BANK: NORMAL

## 2021-10-14 ENCOUNTER — ROUTINE PRENATAL (OUTPATIENT)
Dept: OBGYN | Age: 26
End: 2021-10-14
Payer: MEDICAID

## 2021-10-14 DIAGNOSIS — O03.9 SAB (SPONTANEOUS ABORTION): Primary | ICD-10-CM

## 2021-10-14 PROCEDURE — 99212 OFFICE O/P EST SF 10 MIN: CPT | Performed by: OBSTETRICS & GYNECOLOGY

## 2021-10-14 NOTE — PROGRESS NOTES
2 week follow up from [facility]  on 10/1/21. Denies any bleeding today. Is currently taking po iron.     S: no c/o, bleeding resolved, feels well on Fe  O: looks well  A/P: s/p D+C for SAb with hemorrhage  Finish 2 mo course of Fe, pre-op hgb 8.8  Can conceive after normal LMP but may want to wait until finished Fe

## 2022-02-10 LAB
ABO, EXTERNAL RESULT: NORMAL
HEP B, EXTERNAL RESULT: NEGATIVE
HEPATITIS C ANTIBODY, EXTERNAL RESULT: NEGATIVE
HIV, EXTERNAL RESULT: NON REACTIVE
RH FACTOR, EXTERNAL RESULT: POSITIVE
RPR, EXTERNAL RESULT: NON REACTIVE

## 2022-09-06 LAB — GBS, EXTERNAL RESULT: NOT DETECTED

## 2022-09-27 ENCOUNTER — ANESTHESIA EVENT (OUTPATIENT)
Dept: LABOR AND DELIVERY | Age: 27
DRG: 540 | End: 2022-09-27
Payer: COMMERCIAL

## 2022-09-27 ENCOUNTER — HOSPITAL ENCOUNTER (INPATIENT)
Age: 27
LOS: 3 days | Discharge: HOME | DRG: 540 | End: 2022-09-30
Attending: OBSTETRICS & GYNECOLOGY | Admitting: OBSTETRICS & GYNECOLOGY
Payer: COMMERCIAL

## 2022-09-27 ENCOUNTER — ANESTHESIA (OUTPATIENT)
Dept: LABOR AND DELIVERY | Age: 27
DRG: 540 | End: 2022-09-27
Payer: COMMERCIAL

## 2022-09-27 PROBLEM — N85.A UTERINE SCAR FROM PREVIOUS CESAREAN DELIVERY: Status: ACTIVE | Noted: 2022-09-27

## 2022-09-27 LAB
ABO/RH: NORMAL
AMPHETAMINE SCREEN, URINE: NORMAL
ANTIBODY SCREEN: NORMAL
BARBITURATE SCREEN URINE: NORMAL
BENZODIAZEPINE SCREEN, URINE: NORMAL
BUPRENORPHINE URINE: NORMAL
CANNABINOID SCREEN URINE: NORMAL
COCAINE METABOLITE SCREEN URINE: NORMAL
FENTANYL SCREEN, URINE: NORMAL
HCT VFR BLD CALC: 33.5 % (ref 36–48)
HEMOGLOBIN: 11.4 G/DL (ref 12–16)
Lab: NORMAL
MCH RBC QN AUTO: 28.3 PG (ref 26–34)
MCHC RBC AUTO-ENTMCNC: 33.9 G/DL (ref 31–36)
MCV RBC AUTO: 83.4 FL (ref 80–100)
METHADONE SCREEN, URINE: NORMAL
OPIATE SCREEN URINE: NORMAL
OXYCODONE URINE: NORMAL
PDW BLD-RTO: 14.5 % (ref 12.4–15.4)
PH UA: 6
PHENCYCLIDINE SCREEN URINE: NORMAL
PLATELET # BLD: 250 K/UL (ref 135–450)
PMV BLD AUTO: 8.9 FL (ref 5–10.5)
RBC # BLD: 4.02 M/UL (ref 4–5.2)
SARS-COV-2, NAAT: NOT DETECTED
WBC # BLD: 7.8 K/UL (ref 4–11)

## 2022-09-27 PROCEDURE — 80307 DRUG TEST PRSMV CHEM ANLYZR: CPT

## 2022-09-27 PROCEDURE — 87635 SARS-COV-2 COVID-19 AMP PRB: CPT

## 2022-09-27 PROCEDURE — A4216 STERILE WATER/SALINE, 10 ML: HCPCS | Performed by: OBSTETRICS & GYNECOLOGY

## 2022-09-27 PROCEDURE — 86900 BLOOD TYPING SEROLOGIC ABO: CPT

## 2022-09-27 PROCEDURE — 2709999900 HC NON-CHARGEABLE SUPPLY: Performed by: OBSTETRICS & GYNECOLOGY

## 2022-09-27 PROCEDURE — 2500000003 HC RX 250 WO HCPCS: Performed by: OBSTETRICS & GYNECOLOGY

## 2022-09-27 PROCEDURE — 6360000002 HC RX W HCPCS: Performed by: OBSTETRICS & GYNECOLOGY

## 2022-09-27 PROCEDURE — 2580000003 HC RX 258: Performed by: OBSTETRICS & GYNECOLOGY

## 2022-09-27 PROCEDURE — 86850 RBC ANTIBODY SCREEN: CPT

## 2022-09-27 PROCEDURE — 85027 COMPLETE CBC AUTOMATED: CPT

## 2022-09-27 PROCEDURE — 3609079900 HC CESAREAN SECTION: Performed by: OBSTETRICS & GYNECOLOGY

## 2022-09-27 PROCEDURE — 3700000000 HC ANESTHESIA ATTENDED CARE: Performed by: OBSTETRICS & GYNECOLOGY

## 2022-09-27 PROCEDURE — 86780 TREPONEMA PALLIDUM: CPT

## 2022-09-27 PROCEDURE — 6360000002 HC RX W HCPCS: Performed by: NURSE ANESTHETIST, CERTIFIED REGISTERED

## 2022-09-27 PROCEDURE — 2500000003 HC RX 250 WO HCPCS: Performed by: NURSE ANESTHETIST, CERTIFIED REGISTERED

## 2022-09-27 PROCEDURE — 3700000001 HC ADD 15 MINUTES (ANESTHESIA): Performed by: OBSTETRICS & GYNECOLOGY

## 2022-09-27 PROCEDURE — 1220000000 HC SEMI PRIVATE OB R&B

## 2022-09-27 PROCEDURE — 7100000000 HC PACU RECOVERY - FIRST 15 MIN: Performed by: OBSTETRICS & GYNECOLOGY

## 2022-09-27 PROCEDURE — 86901 BLOOD TYPING SEROLOGIC RH(D): CPT

## 2022-09-27 PROCEDURE — 6370000000 HC RX 637 (ALT 250 FOR IP): Performed by: OBSTETRICS & GYNECOLOGY

## 2022-09-27 PROCEDURE — 7100000001 HC PACU RECOVERY - ADDTL 15 MIN: Performed by: OBSTETRICS & GYNECOLOGY

## 2022-09-27 RX ORDER — FERROUS SULFATE 325(65) MG
325 TABLET ORAL
Status: DISCONTINUED | OUTPATIENT
Start: 2022-09-28 | End: 2022-09-30 | Stop reason: HOSPADM

## 2022-09-27 RX ORDER — METRONIDAZOLE 250 MG/1
500 TABLET ORAL EVERY 8 HOURS SCHEDULED
Status: DISCONTINUED | OUTPATIENT
Start: 2022-09-27 | End: 2022-09-27

## 2022-09-27 RX ORDER — DEXAMETHASONE SODIUM PHOSPHATE 4 MG/ML
INJECTION, SOLUTION INTRA-ARTICULAR; INTRALESIONAL; INTRAMUSCULAR; INTRAVENOUS; SOFT TISSUE PRN
Status: DISCONTINUED | OUTPATIENT
Start: 2022-09-27 | End: 2022-09-27 | Stop reason: SDUPTHER

## 2022-09-27 RX ORDER — BUPIVACAINE HYDROCHLORIDE 7.5 MG/ML
INJECTION, SOLUTION INTRASPINAL PRN
Status: DISCONTINUED | OUTPATIENT
Start: 2022-09-27 | End: 2022-09-27 | Stop reason: SDUPTHER

## 2022-09-27 RX ORDER — OXYCODONE HYDROCHLORIDE 5 MG/1
5 TABLET ORAL EVERY 4 HOURS PRN
Status: DISCONTINUED | OUTPATIENT
Start: 2022-09-27 | End: 2022-09-30 | Stop reason: HOSPADM

## 2022-09-27 RX ORDER — BISACODYL 10 MG
10 SUPPOSITORY, RECTAL RECTAL DAILY PRN
Status: DISCONTINUED | OUTPATIENT
Start: 2022-09-27 | End: 2022-09-30 | Stop reason: HOSPADM

## 2022-09-27 RX ORDER — KETOROLAC TROMETHAMINE 30 MG/ML
30 INJECTION, SOLUTION INTRAMUSCULAR; INTRAVENOUS EVERY 8 HOURS
Status: DISCONTINUED | OUTPATIENT
Start: 2022-09-27 | End: 2022-09-28

## 2022-09-27 RX ORDER — FERROUS SULFATE 325(65) MG
325 TABLET ORAL
Status: ON HOLD | COMMUNITY
End: 2022-09-30 | Stop reason: HOSPADM

## 2022-09-27 RX ORDER — SODIUM CHLORIDE 0.9 % (FLUSH) 0.9 %
5-40 SYRINGE (ML) INJECTION PRN
Status: DISCONTINUED | OUTPATIENT
Start: 2022-09-27 | End: 2022-09-28 | Stop reason: ALTCHOICE

## 2022-09-27 RX ORDER — MODIFIED LANOLIN
OINTMENT (GRAM) TOPICAL
Status: DISCONTINUED | OUTPATIENT
Start: 2022-09-27 | End: 2022-09-30 | Stop reason: HOSPADM

## 2022-09-27 RX ORDER — CARBOPROST TROMETHAMINE 250 UG/ML
250 INJECTION, SOLUTION INTRAMUSCULAR PRN
Status: DISCONTINUED | OUTPATIENT
Start: 2022-09-27 | End: 2022-09-30 | Stop reason: HOSPADM

## 2022-09-27 RX ORDER — CEPHALEXIN 250 MG/1
500 CAPSULE ORAL EVERY 8 HOURS SCHEDULED
Status: DISCONTINUED | OUTPATIENT
Start: 2022-09-27 | End: 2022-09-27

## 2022-09-27 RX ORDER — SODIUM CHLORIDE, SODIUM LACTATE, POTASSIUM CHLORIDE, CALCIUM CHLORIDE 600; 310; 30; 20 MG/100ML; MG/100ML; MG/100ML; MG/100ML
INJECTION, SOLUTION INTRAVENOUS CONTINUOUS
Status: DISCONTINUED | OUTPATIENT
Start: 2022-09-27 | End: 2022-09-27

## 2022-09-27 RX ORDER — ONDANSETRON 8 MG/1
8 TABLET, ORALLY DISINTEGRATING ORAL EVERY 8 HOURS PRN
Status: DISCONTINUED | OUTPATIENT
Start: 2022-09-27 | End: 2022-09-30 | Stop reason: HOSPADM

## 2022-09-27 RX ORDER — SODIUM CHLORIDE 9 MG/ML
INJECTION, SOLUTION INTRAVENOUS PRN
Status: DISCONTINUED | OUTPATIENT
Start: 2022-09-27 | End: 2022-09-30 | Stop reason: HOSPADM

## 2022-09-27 RX ORDER — KETOROLAC TROMETHAMINE 30 MG/ML
INJECTION, SOLUTION INTRAMUSCULAR; INTRAVENOUS PRN
Status: DISCONTINUED | OUTPATIENT
Start: 2022-09-27 | End: 2022-09-27 | Stop reason: SDUPTHER

## 2022-09-27 RX ORDER — ACETAMINOPHEN 325 MG/1
975 TABLET ORAL ONCE
Status: COMPLETED | OUTPATIENT
Start: 2022-09-27 | End: 2022-09-27

## 2022-09-27 RX ORDER — SODIUM CHLORIDE 0.9 % (FLUSH) 0.9 %
10 SYRINGE (ML) INJECTION EVERY 12 HOURS SCHEDULED
Status: DISCONTINUED | OUTPATIENT
Start: 2022-09-27 | End: 2022-09-27

## 2022-09-27 RX ORDER — SODIUM CHLORIDE 9 MG/ML
INJECTION, SOLUTION INTRAVENOUS PRN
Status: DISCONTINUED | OUTPATIENT
Start: 2022-09-27 | End: 2022-09-27

## 2022-09-27 RX ORDER — ACETAMINOPHEN 500 MG
1000 TABLET ORAL EVERY 8 HOURS
Status: DISCONTINUED | OUTPATIENT
Start: 2022-09-27 | End: 2022-09-30 | Stop reason: HOSPADM

## 2022-09-27 RX ORDER — MORPHINE SULFATE 10 MG/ML
INJECTION, SOLUTION INTRAMUSCULAR; INTRAVENOUS PRN
Status: DISCONTINUED | OUTPATIENT
Start: 2022-09-27 | End: 2022-09-27 | Stop reason: SDUPTHER

## 2022-09-27 RX ORDER — MIDAZOLAM HYDROCHLORIDE 1 MG/ML
INJECTION INTRAMUSCULAR; INTRAVENOUS PRN
Status: DISCONTINUED | OUTPATIENT
Start: 2022-09-27 | End: 2022-09-27 | Stop reason: SDUPTHER

## 2022-09-27 RX ORDER — DOCUSATE SODIUM 100 MG/1
100 CAPSULE, LIQUID FILLED ORAL 2 TIMES DAILY
Status: DISCONTINUED | OUTPATIENT
Start: 2022-09-27 | End: 2022-09-30 | Stop reason: HOSPADM

## 2022-09-27 RX ORDER — SODIUM CHLORIDE 0.9 % (FLUSH) 0.9 %
5-40 SYRINGE (ML) INJECTION EVERY 12 HOURS SCHEDULED
Status: DISCONTINUED | OUTPATIENT
Start: 2022-09-27 | End: 2022-09-28 | Stop reason: ALTCHOICE

## 2022-09-27 RX ORDER — ONDANSETRON 2 MG/ML
INJECTION INTRAMUSCULAR; INTRAVENOUS PRN
Status: DISCONTINUED | OUTPATIENT
Start: 2022-09-27 | End: 2022-09-27 | Stop reason: SDUPTHER

## 2022-09-27 RX ORDER — FAMOTIDINE 20 MG/1
20 TABLET, FILM COATED ORAL 2 TIMES DAILY PRN
Status: DISCONTINUED | OUTPATIENT
Start: 2022-09-27 | End: 2022-09-30 | Stop reason: HOSPADM

## 2022-09-27 RX ORDER — POLYETHYLENE GLYCOL 3350 17 G/17G
17 POWDER, FOR SOLUTION ORAL DAILY
Status: DISCONTINUED | OUTPATIENT
Start: 2022-09-27 | End: 2022-09-30 | Stop reason: HOSPADM

## 2022-09-27 RX ORDER — DIPHENHYDRAMINE HYDROCHLORIDE 50 MG/ML
25 INJECTION INTRAMUSCULAR; INTRAVENOUS EVERY 6 HOURS PRN
Status: DISCONTINUED | OUTPATIENT
Start: 2022-09-27 | End: 2022-09-30 | Stop reason: HOSPADM

## 2022-09-27 RX ORDER — SIMETHICONE 80 MG
80 TABLET,CHEWABLE ORAL EVERY 6 HOURS PRN
Status: DISCONTINUED | OUTPATIENT
Start: 2022-09-27 | End: 2022-09-30 | Stop reason: HOSPADM

## 2022-09-27 RX ORDER — IBUPROFEN 800 MG/1
800 TABLET ORAL EVERY 8 HOURS
Status: DISCONTINUED | OUTPATIENT
Start: 2022-10-01 | End: 2022-09-28

## 2022-09-27 RX ORDER — MISOPROSTOL 100 UG/1
800 TABLET ORAL PRN
Status: DISCONTINUED | OUTPATIENT
Start: 2022-09-27 | End: 2022-09-30 | Stop reason: HOSPADM

## 2022-09-27 RX ORDER — METOCLOPRAMIDE HYDROCHLORIDE 5 MG/ML
10 INJECTION INTRAMUSCULAR; INTRAVENOUS ONCE
Status: COMPLETED | OUTPATIENT
Start: 2022-09-27 | End: 2022-09-27

## 2022-09-27 RX ORDER — OXYCODONE HYDROCHLORIDE 5 MG/1
10 TABLET ORAL EVERY 4 HOURS PRN
Status: DISCONTINUED | OUTPATIENT
Start: 2022-09-27 | End: 2022-09-30 | Stop reason: HOSPADM

## 2022-09-27 RX ORDER — SODIUM CHLORIDE 0.9 % (FLUSH) 0.9 %
10 SYRINGE (ML) INJECTION PRN
Status: DISCONTINUED | OUTPATIENT
Start: 2022-09-27 | End: 2022-09-27

## 2022-09-27 RX ORDER — MISOPROSTOL 100 UG/1
200 TABLET ORAL PRN
Status: DISCONTINUED | OUTPATIENT
Start: 2022-09-27 | End: 2022-09-30 | Stop reason: HOSPADM

## 2022-09-27 RX ORDER — SODIUM CHLORIDE, SODIUM LACTATE, POTASSIUM CHLORIDE, CALCIUM CHLORIDE 600; 310; 30; 20 MG/100ML; MG/100ML; MG/100ML; MG/100ML
1000 INJECTION, SOLUTION INTRAVENOUS ONCE
Status: DISCONTINUED | OUTPATIENT
Start: 2022-09-27 | End: 2022-09-27

## 2022-09-27 RX ORDER — TRISODIUM CITRATE DIHYDRATE AND CITRIC ACID MONOHYDRATE 500; 334 MG/5ML; MG/5ML
30 SOLUTION ORAL ONCE
Status: COMPLETED | OUTPATIENT
Start: 2022-09-27 | End: 2022-09-27

## 2022-09-27 RX ORDER — SODIUM CHLORIDE, SODIUM LACTATE, POTASSIUM CHLORIDE, CALCIUM CHLORIDE 600; 310; 30; 20 MG/100ML; MG/100ML; MG/100ML; MG/100ML
INJECTION, SOLUTION INTRAVENOUS CONTINUOUS
Status: DISCONTINUED | OUTPATIENT
Start: 2022-09-27 | End: 2022-09-30 | Stop reason: HOSPADM

## 2022-09-27 RX ORDER — METHYLERGONOVINE MALEATE 0.2 MG/ML
200 INJECTION INTRAVENOUS PRN
Status: DISCONTINUED | OUTPATIENT
Start: 2022-09-27 | End: 2022-09-30 | Stop reason: HOSPADM

## 2022-09-27 RX ORDER — KETOROLAC TROMETHAMINE 30 MG/ML
30 INJECTION, SOLUTION INTRAMUSCULAR; INTRAVENOUS ONCE
Status: COMPLETED | OUTPATIENT
Start: 2022-09-27 | End: 2022-09-27

## 2022-09-27 RX ORDER — ONDANSETRON 2 MG/ML
4 INJECTION INTRAMUSCULAR; INTRAVENOUS EVERY 6 HOURS PRN
Status: DISCONTINUED | OUTPATIENT
Start: 2022-09-27 | End: 2022-09-30 | Stop reason: HOSPADM

## 2022-09-27 RX ADMIN — SODIUM CHLORIDE, POTASSIUM CHLORIDE, SODIUM LACTATE AND CALCIUM CHLORIDE: 600; 310; 30; 20 INJECTION, SOLUTION INTRAVENOUS at 09:43

## 2022-09-27 RX ADMIN — PHENYLEPHRINE HYDROCHLORIDE 200 MCG: 10 INJECTION INTRAVENOUS at 11:35

## 2022-09-27 RX ADMIN — PHENYLEPHRINE HYDROCHLORIDE 200 MCG: 10 INJECTION INTRAVENOUS at 11:44

## 2022-09-27 RX ADMIN — SODIUM CHLORIDE, POTASSIUM CHLORIDE, SODIUM LACTATE AND CALCIUM CHLORIDE: 600; 310; 30; 20 INJECTION, SOLUTION INTRAVENOUS at 19:15

## 2022-09-27 RX ADMIN — ACETAMINOPHEN 1000 MG: 500 TABLET ORAL at 18:28

## 2022-09-27 RX ADMIN — SODIUM CHLORIDE, POTASSIUM CHLORIDE, SODIUM LACTATE AND CALCIUM CHLORIDE: 600; 310; 30; 20 INJECTION, SOLUTION INTRAVENOUS at 11:23

## 2022-09-27 RX ADMIN — KETOROLAC TROMETHAMINE 30 MG: 30 INJECTION, SOLUTION INTRAMUSCULAR; INTRAVENOUS at 12:10

## 2022-09-27 RX ADMIN — ONDANSETRON 4 MG: 2 INJECTION INTRAMUSCULAR; INTRAVENOUS at 16:00

## 2022-09-27 RX ADMIN — KETOROLAC TROMETHAMINE 30 MG: 30 INJECTION, SOLUTION INTRAMUSCULAR at 13:59

## 2022-09-27 RX ADMIN — Medication 999 ML/HR: at 11:58

## 2022-09-27 RX ADMIN — KETOROLAC TROMETHAMINE 30 MG: 30 INJECTION, SOLUTION INTRAMUSCULAR at 23:49

## 2022-09-27 RX ADMIN — FAMOTIDINE 20 MG: 10 INJECTION INTRAVENOUS at 09:51

## 2022-09-27 RX ADMIN — PHENYLEPHRINE HYDROCHLORIDE 200 MCG: 10 INJECTION INTRAVENOUS at 11:54

## 2022-09-27 RX ADMIN — Medication 87.3 MILLI-UNITS/MIN: at 12:32

## 2022-09-27 RX ADMIN — PHENYLEPHRINE HYDROCHLORIDE 200 MCG: 10 INJECTION INTRAVENOUS at 11:31

## 2022-09-27 RX ADMIN — METOCLOPRAMIDE 10 MG: 5 INJECTION, SOLUTION INTRAMUSCULAR; INTRAVENOUS at 09:49

## 2022-09-27 RX ADMIN — MORPHINE SULFATE 0.2 MG: 10 INJECTION, SOLUTION INTRAMUSCULAR; INTRAVENOUS at 11:29

## 2022-09-27 RX ADMIN — DOCUSATE SODIUM 100 MG: 100 CAPSULE, LIQUID FILLED ORAL at 20:19

## 2022-09-27 RX ADMIN — ONDANSETRON 4 MG: 2 INJECTION INTRAMUSCULAR; INTRAVENOUS at 11:35

## 2022-09-27 RX ADMIN — ACETAMINOPHEN 975 MG: 325 TABLET ORAL at 09:53

## 2022-09-27 RX ADMIN — SODIUM CITRATE AND CITRIC ACID MONOHYDRATE 30 ML: 500; 334 SOLUTION ORAL at 09:52

## 2022-09-27 RX ADMIN — BUPIVACAINE HYDROCHLORIDE 1.5 ML: 7.5 INJECTION, SOLUTION INTRASPINAL at 11:29

## 2022-09-27 RX ADMIN — DEXAMETHASONE SODIUM PHOSPHATE 4 MG: 4 INJECTION, SOLUTION INTRAMUSCULAR; INTRAVENOUS at 11:35

## 2022-09-27 RX ADMIN — MIDAZOLAM 2 MG: 1 INJECTION INTRAMUSCULAR; INTRAVENOUS at 12:00

## 2022-09-27 RX ADMIN — Medication 10 ML: at 20:27

## 2022-09-27 ASSESSMENT — PAIN SCALES - GENERAL
PAINLEVEL_OUTOF10: 0
PAINLEVEL_OUTOF10: 3
PAINLEVEL_OUTOF10: 2
PAINLEVEL_OUTOF10: 5

## 2022-09-27 ASSESSMENT — PAIN DESCRIPTION - ORIENTATION: ORIENTATION: ANTERIOR

## 2022-09-27 ASSESSMENT — PAIN DESCRIPTION - DESCRIPTORS: DESCRIPTORS: CRAMPING

## 2022-09-27 ASSESSMENT — PAIN DESCRIPTION - LOCATION: LOCATION: ABDOMEN

## 2022-09-27 NOTE — ANESTHESIA PRE PROCEDURE
Department of Anesthesiology  Preprocedure Note       Name:  Jacqueline Washington   Age:  32 y.o.  :  1995                                          MRN:  [id number]         Date:  2022      Surgeon: Dagmar Aceves):  Marito Wright MD    Procedure: Procedure(s):   SECTION    Medications prior to admission:   Prior to Admission medications    Medication Sig Start Date End Date Taking?  Authorizing Provider   ferrous sulfate (IRON 325) 325 (65 Fe) MG tablet Take 325 mg by mouth daily (with breakfast)   Yes Historical Provider, MD   acetaminophen (APAP EXTRA STRENGTH) 500 MG tablet Take 2 tablets by mouth every 6 hours as needed for Pain  Patient not taking: Reported on 2021 3/27/20   Arvin Menjivar MD   ibuprofen (ADVIL;MOTRIN) 800 MG tablet Take 1 tablet by mouth every 8 hours as needed for Pain  Patient not taking: Reported on 10/14/2021 3/27/20   Arvin Mejnivar MD       Current medications:    Current Facility-Administered Medications   Medication Dose Route Frequency Provider Last Rate Last Admin    lactated ringers infusion   IntraVENous Continuous Marito Wright  mL/hr at 22 0943 New Bag at 22 0943    lactated ringers infusion 1,000 mL  1,000 mL IntraVENous Once Marito Wright MD        sodium chloride flush 0.9 % injection 10 mL  10 mL IntraVENous 2 times per day Marito Wright MD        sodium chloride flush 0.9 % injection 10 mL  10 mL IntraVENous PRN Tavon Mead MD        0.9 % sodium chloride infusion   IntraVENous PRN Marito Wright MD        oxytocin (PITOCIN) 30 units in 500 mL infusion  87.3 remington-units/min IntraVENous Continuous PRN Tavon Mead MD        And    oxytocin (PITOCIN) 10 unit bolus from the bag  10 Units IntraVENous PRN Marito Wright MD        ceFAZolin (ANCEF) 2,000 mg in dextrose 5 % 50 mL IVPB (mini-bag)  2,000 mg IntraVENous Q8H Tavon Mead MD           Allergies:  No Known Allergies    Problem List:    Patient Active Problem List   Diagnosis Code    Risk of  labor O09.219    LGSIL on Pap smear of cervix R87.612    Incomplete  O03.4    Uterine scar from previous  delivery O34.219       Past Medical History:        Diagnosis Date    Abnormal Pap smear of cervix     History of colposcopy 2019    Mental disorder     anxiety/depression. no meds       Past Surgical History:        Procedure Laterality Date     SECTION N/A 3/27/2020     SECTION performed by [facility]ailyn [facility]k MD at [facility]    DILATION AND CURETTAGE OF UTERUS N/A 10/1/2021    DILATATION AND CURETTAGE SUCTION performed by Mariano Pinto MD at [address] EXTRACTION         Social History:    Social History     Tobacco Use    Smoking status: Former     Packs/day: 0.25     Years: 3.00     Pack years: 0.75     Types: Cigarettes     Quit date: 7/15/2019     Years since quitting: 3.2    Smokeless tobacco: Never   Substance Use Topics    Alcohol use: Not Currently                                Counseling given: Not Answered      Vital Signs (Current):   Vitals:    22 0840 22 0857   BP: 130/80    Pulse: (!) 106    Resp: 18    Temp: 36.7 °C (98 °F)    TempSrc: Oral    Weight:  200 lb (90.7 kg)   Height:  5' 7\" (1.702 m)                                              BP Readings from Last 3 Encounters:   22 130/80   10/01/21 (!) 104/49   10/01/21 (!) 85/41       NPO Status:                                                                                 BMI:   Wt Readings from Last 3 Encounters:   22 200 lb (90.7 kg)   10/01/21 175 lb 9.6 oz (79.7 kg)   20 181 lb (82.1 kg)     Body mass index is 31.32 kg/m².     CBC:   Lab Results   Component Value Date/Time    WBC 7.8 2022 09:04 AM    RBC 4.02 2022 09:04 AM    HGB 11.4 2022 09:04 AM    HCT 33.5 2022 09:04 AM    MCV 83.4 2022 09:04 AM    RDW 14.5 Findings:        Lab Results   Component Value Date    WBC 7.8 09/27/2022    HGB 11.4 (L) 09/27/2022    HCT 33.5 (L) 09/27/2022    MCV 83.4 09/27/2022     09/27/2022     Wt Readings from Last 3 Encounters:   09/27/22 200 lb (90.7 kg)   10/01/21 175 lb 9.6 oz (79.7 kg)   03/19/20 181 lb (82.1 kg)     Temp Readings from Last 3 Encounters:   09/27/22 36.7 °C (98 °F) (Oral)   10/01/21 36.2 °C (97.2 °F) (Temporal)   10/01/21 37 °C (98.6 °F)     BP Readings from Last 3 Encounters:   09/27/22 130/80   10/01/21 (!) 104/49   10/01/21 (!) 85/41     Pulse Readings from Last 3 Encounters:   09/27/22 (!) 106   10/01/21 98   03/30/20 90          Anesthesia Plan      epidural     ASA 2             Anesthetic plan and risks discussed with patient. Use of blood products discussed with patient whom. Plan discussed with CRNA and attending.     Attending anesthesiologist reviewed and agrees with Preprocedure content                KISHORE Retana - CAMACHO   9/27/2022

## 2022-09-27 NOTE — DISCHARGE SUMMARY
Obstetrical Discharge Form    Gestational Age: 39w2d    Antepartum complications: none    Date of Delivery: 22      Type of Delivery:  RLTCS    Delivered By:  Signa Libman. Narendran    Baby:      Information for the patient's :  Nito Wang [[id number]]   APGAR One: N/A   Information for the patient's :  Nito Wang [[id number]]   APGAR Five: N/A   Information for the patient's :  Nito Wang [[id number]]   Birth Weight: N/A     Anesthesia: Spinal    Intrapartum complications: None    Postpartum complications: none    Discharge Medication:      Medication List        STOP taking these medications      acetaminophen 500 MG tablet  Commonly known as: APAP Extra Strength     ibuprofen 800 MG tablet  Commonly known as: ADVIL;MOTRIN            ASK your doctor about these medications      ferrous sulfate 325 (65 Fe) MG tablet  Commonly known as: IRON 325              Discharge Condition:  good    Discharge Date: 22    PLAN:  Follow up in 6 weeks for routine PP visit  All questions answered  D/C summary begun at delivery for D/C planning purposes, any delay in discharge from ordered D/C date due to  factors.

## 2022-09-27 NOTE — PROGRESS NOTES
Pt with routine  delivery with not s/s of infection. Dr Neo Wilkins notified of abx ordered for pt and nurse did not see any indication of why. Ordered to discontinue abx per Dr Neo Wilkins.

## 2022-09-27 NOTE — H&P
Department of Obstetrics and Gynecology   Obstetrics History and Physical        CHIEF COMPLAINT:  Admitted for RCS    HISTORY OF PRESENT ILLNESS:      The patient is a 32 y.o. female at Unknown. OB History          3    Para   1    Term   1       0    AB   0    Living   1         SAB   0    IAB   0    Ectopic   0    Molar   0    Multiple   0    Live Births   1            Patient presents with a chief complaint as above and is being admitted for RCS, prior CS, declined     Estimated Due Date: Estimated Date of Delivery: None noted. PRENATAL CARE:    Complicated by: depression, doing well without meds    PAST OB HISTORY:  OB History          3    Para   1    Term   1       0    AB   0    Living   1         SAB   0    IAB   0    Ectopic   0    Molar   0    Multiple   0    Live Births   1                Past Medical History:        Diagnosis Date    Abnormal Pap smear of cervix     History of colposcopy 2019    Mental disorder     anxiety/depression. no meds     Past Surgical History:        Procedure Laterality Date     SECTION N/A 3/27/2020     SECTION performed by Ivana Sparks MD at [facility] L&D OR    DILATION AND CURETTAGE OF UTERUS N/A 10/1/2021    DILATATION AND CURETTAGE SUCTION performed by Tadeo Murphy MD at [address] [place] [address] [place]:  Patient has no known allergies.     Social History:    Social History     Socioeconomic History    Marital status: Single     Spouse name: Not on file    Number of children: Not on file    Years of education: Not on file    Highest education level: Not on file   Occupational History    Not on file   Tobacco Use    Smoking status: Former     Packs/day: 0.25     Years: 3.00     Pack years: 0.75     Types: Cigarettes     Quit date: 7/15/2019     Years since quitting: 3.2    Smokeless tobacco: Never   Vaping Use    Vaping Use: Never used   Substance and Sexual Activity    Alcohol use: Not Currently    Drug use: Not Currently     Types: Cocaine    Sexual activity: Yes     Partners: Male   Other Topics Concern    Not on file   Social History Narrative    ** Merged History Encounter **          Social Determinants of Health     Financial Resource Strain: Not on file   Food Insecurity: Not on file   Transportation Needs: Not on file   Physical Activity: Not on file   Stress: Not on file   Social Connections: Not on file   Intimate Partner Violence: Not on file   Housing Stability: Not on file     Family History:       Problem Relation Age of Onset    Breast Cancer Maternal Grandmother     Diabetes Mother     Hypertension Mother     Cancer Mother      Medications Prior to Admission:  Medications Prior to Admission: acetaminophen (APAP EXTRA STRENGTH) 500 MG tablet, Take 2 tablets by mouth every 6 hours as needed for Pain (Patient not taking: Reported on 9/30/2021)  ibuprofen (ADVIL;MOTRIN) 800 MG tablet, Take 1 tablet by mouth every 8 hours as needed for Pain (Patient not taking: Reported on 10/14/2021)    REVIEW OF SYSTEMS:    8 lbs    PHYSICAL EXAM:  Vitals:    09/27/22 0840 09/27/22 0857   BP: 130/80    Pulse: (!) 106    Resp: 18    Temp: 98 °F (36.7 °C)    TempSrc: Oral    Weight:  200 lb (90.7 kg)   Height:  5' 7\" (1.702 m)     General appearance:  awake, alert, cooperative, no apparent distress, and appears stated age  Neurologic:  Awake, alert, oriented to name, place and time. Lungs:  No increased work of breathing, good air exchange  Abdomen:  Soft, non tender, gravid, consistent with her gestational age, EFW by Leopold's maneuver was 8 lbs   Fetal heart rate:  Reassuring.   Membranes:  Intact    Labs: CBC:   Lab Results   Component Value Date/Time    WBC 7.2 02/10/2022 09:44 AM    WBC 13.1 10/01/2021 04:20 PM    RBC 4.57 02/10/2022 09:44 AM    HGB 13.0 02/10/2022 09:44 AM    HCT 38.0 02/10/2022 09:44 AM    MCV 83.2 02/10/2022 09:44 AM    MCH 28.4 02/10/2022 09:44 AM    MCHC 34.2 02/10/2022

## 2022-09-27 NOTE — OP NOTE
Operative Note      Patient: Petar Latham  YOB: 1995  MRN: [id number]    Date of Procedure: 2022    Pre-operative Diagnosis:   Term pregnancy, prior CS, declined     Post-operative Diagnosis:  Same    Procedure:  repeat low transverse  section    Surgeon:  Dr Shivam Hagan    Anesthesia:  Spinal anesthesia    Tubes, uterus, ovaries:  normal    Estimated blood loss:  500ml    Specimens:  none    Cord Gases: no    Drains: no    Complications:  none    Information for the patient's :  Shanita Nair [[id number]]        Condition:    Infant stable, transfer to General Care Nursery  Mother stable, transfer to post anesthesia recovery    OPERATIVE INDICATIONS:  The patient is a 32 y.o., White female   at 39w2d who is admitted for repeat. She was adequately counseled regarding all risks, benefits and alternatives and consented for the procedure. OPERATIVE FINDINGS:   Information for the patient's :  Maura Babcock [[id number]]    . Delivered via low transverse  section. Amniotic fluid was clear. Placenta was  healthy. Uterus, tubes and ovaries were normal.      OPERATIVE DETAILS:  The patient was taken to the operating room and given spinal.  She was placed in the supine position with 15 degree Left lateral tilt. The surgical site was prepped and draped and a Jiménez catheter was placed. A Pfannenstiel  incision was made through a previous incision and opening was done in a routine fashion. After opening the peritoneal cavity, lower uterine segment was inspected and found to be adequate. A bladder flap was now created and bladder was dissected down using Metzenbaum scissors. At this point a transverse incision was made in the lower uterine segment and extended adequately on either side. The fetus was delivered from its Cephalic presentation without any difficulty.   Cord was clamped and cut and the baby was handed over to the nursing staff.  The placenta with its membranes was now removed. The uterus was now cleared of all membranes and blood clots. The uterine incision was closed in 2 layers first with 0 Vicryl continuous locking and second with an imbricating layer. The visceral peritoneum was now closed with 2-0 Vicryl. The parietal peritoneum was closed with 2-0 Vicryl. Hemostasis was confirmed at each level of the procedure. The rectus muscle was now closed with interrupted figure-of-eight sutures with 2-0 Vicryl. The rectus fascia was closed with 0 Vicryl continuous locking with 2 sutures beginning at either end of the incision and meeting in the midline. At this point hemostasis was again confirmed. The subcutaneous tissue was closed with 3-0 Vicryl and skin with Sutures/staples:[id number]}. A dry dressing was applied to the wound. The uterus was massaged and the vagina was emptied of all blood clots. At this point all instrument, needle and sponge counts were correct  x3.      Jacky Allen MD           Electronically signed by Jacky Allen MD on 9/27/2022 at 12:26 PM

## 2022-09-27 NOTE — ANESTHESIA PROCEDURE NOTES
Spinal Block    Patient location during procedure: OB  End time: 9/27/2022 11:29 AM  Reason for block: primary anesthetic  Spinal Block  Patient position: sitting  Prep: ChloraPrep and site prepped and draped  Patient monitoring: continuous pulse ox, cardiac monitor, continuous capnometry and frequent blood pressure checks  Approach: midline  Location: L2/L3  Provider prep: mask and sterile gloves  Needle  Needle type: Pencan   Needle gauge: 25 G  Needle length: 3.5 in  Assessment  Sensory level: T6  Swirl obtained: Yes  CSF: clear  Attempts: 1  Hemodynamics: stable  Preanesthetic Checklist  Completed: patient identified, IV checked, site marked, risks and benefits discussed, surgical/procedural consents, equipment checked, pre-op evaluation, timeout performed, anesthesia consent given, oxygen available, monitors applied/VS acknowledged, fire risk safety assessment completed and verbalized and blood product R/B/A discussed and consented

## 2022-09-27 NOTE — PROGRESS NOTES
Shaved pt's stomach and cleaned pt's stomach for 3 minutes. Dermal Autograft Text: The defect edges were debeveled with a #15 scalpel blade.  Given the location of the defect, shape of the defect and the proximity to free margins a dermal autograft was deemed most appropriate.  Using a sterile surgical marker, the primary defect shape was transferred to the donor site. The area thus outlined was incised deep to adipose tissue with a #15 scalpel blade.  The harvested graft was then trimmed of adipose and epidermal tissue until only dermis was left.  The skin graft was then placed in the primary defect and oriented appropriately.

## 2022-09-28 LAB
BASOPHILS ABSOLUTE: 0.1 K/UL (ref 0–0.2)
BASOPHILS RELATIVE PERCENT: 0.6 %
EOSINOPHILS ABSOLUTE: 0.1 K/UL (ref 0–0.6)
EOSINOPHILS RELATIVE PERCENT: 0.9 %
HCT VFR BLD CALC: 30.5 % (ref 36–48)
HEMOGLOBIN: 10.1 G/DL (ref 12–16)
LYMPHOCYTES ABSOLUTE: 1.5 K/UL (ref 1–5.1)
LYMPHOCYTES RELATIVE PERCENT: 15.2 %
MCH RBC QN AUTO: 27.8 PG (ref 26–34)
MCHC RBC AUTO-ENTMCNC: 33.1 G/DL (ref 31–36)
MCV RBC AUTO: 83.9 FL (ref 80–100)
MONOCYTES ABSOLUTE: 0.8 K/UL (ref 0–1.3)
MONOCYTES RELATIVE PERCENT: 7.5 %
NEUTROPHILS ABSOLUTE: 7.7 K/UL (ref 1.7–7.7)
NEUTROPHILS RELATIVE PERCENT: 75.8 %
PDW BLD-RTO: 14.7 % (ref 12.4–15.4)
PLATELET # BLD: 217 K/UL (ref 135–450)
PMV BLD AUTO: 8.6 FL (ref 5–10.5)
RBC # BLD: 3.64 M/UL (ref 4–5.2)
TOTAL SYPHILLIS IGG/IGM: NORMAL
WBC # BLD: 10.1 K/UL (ref 4–11)

## 2022-09-28 PROCEDURE — 6360000002 HC RX W HCPCS: Performed by: OBSTETRICS & GYNECOLOGY

## 2022-09-28 PROCEDURE — 1220000000 HC SEMI PRIVATE OB R&B

## 2022-09-28 PROCEDURE — 6370000000 HC RX 637 (ALT 250 FOR IP): Performed by: OBSTETRICS & GYNECOLOGY

## 2022-09-28 PROCEDURE — 85025 COMPLETE CBC W/AUTO DIFF WBC: CPT

## 2022-09-28 PROCEDURE — 2580000003 HC RX 258: Performed by: OBSTETRICS & GYNECOLOGY

## 2022-09-28 RX ORDER — IBUPROFEN 800 MG/1
800 TABLET ORAL EVERY 8 HOURS
Status: DISCONTINUED | OUTPATIENT
Start: 2022-09-28 | End: 2022-09-30 | Stop reason: HOSPADM

## 2022-09-28 RX ADMIN — DOCUSATE SODIUM 100 MG: 100 CAPSULE, LIQUID FILLED ORAL at 08:48

## 2022-09-28 RX ADMIN — POLYETHYLENE GLYCOL 3350 17 G: 17 POWDER, FOR SOLUTION ORAL at 08:48

## 2022-09-28 RX ADMIN — KETOROLAC TROMETHAMINE 30 MG: 30 INJECTION, SOLUTION INTRAMUSCULAR at 09:02

## 2022-09-28 RX ADMIN — Medication 10 ML: at 08:48

## 2022-09-28 RX ADMIN — ACETAMINOPHEN 1000 MG: 500 TABLET ORAL at 11:48

## 2022-09-28 RX ADMIN — DOCUSATE SODIUM 100 MG: 100 CAPSULE, LIQUID FILLED ORAL at 20:29

## 2022-09-28 RX ADMIN — IBUPROFEN 800 MG: 800 TABLET, FILM COATED ORAL at 17:44

## 2022-09-28 RX ADMIN — ACETAMINOPHEN 1000 MG: 500 TABLET ORAL at 03:52

## 2022-09-28 RX ADMIN — ACETAMINOPHEN 1000 MG: 500 TABLET ORAL at 19:53

## 2022-09-28 ASSESSMENT — PAIN SCALES - GENERAL
PAINLEVEL_OUTOF10: 7
PAINLEVEL_OUTOF10: 7
PAINLEVEL_OUTOF10: 6
PAINLEVEL_OUTOF10: 7
PAINLEVEL_OUTOF10: 5

## 2022-09-28 ASSESSMENT — PAIN DESCRIPTION - LOCATION
LOCATION: INCISION
LOCATION: ABDOMEN;INCISION
LOCATION: ABDOMEN
LOCATION: ABDOMEN

## 2022-09-28 ASSESSMENT — PAIN DESCRIPTION - DESCRIPTORS
DESCRIPTORS: ACHING;CRAMPING
DESCRIPTORS: DISCOMFORT
DESCRIPTORS: DISCOMFORT

## 2022-09-28 ASSESSMENT — PAIN DESCRIPTION - ORIENTATION: ORIENTATION: LOWER

## 2022-09-28 NOTE — ANESTHESIA POSTPROCEDURE EVALUATION
Department of Anesthesiology  Postprocedure Note    Patient: Ryan Olivares  MRN: [id number]  YOB: 1995  Date of evaluation: 2022      Procedure Summary     Date: 22 Room / Location: [facility]&D OR [address] [place] [address]    Anesthesia Start: 8760 Anesthesia Stop:     Procedure:  SECTION (Abdomen) Diagnosis:       S/P repeat low transverse       (repeat )    Surgeons: Nicole Sofia MD Responsible Provider: Latanya Blakely MD    Anesthesia Type: epidural ASA Status: 2          Anesthesia Type: No value filed. Pamela Phase I: Pamela Score: 9    Pamela Phase II: Pamela Score: 9      Anesthesia Post Evaluation    Patient location during evaluation: floor  Patient participation: complete - patient participated  Level of consciousness: awake and alert  Pain score: 1  Airway patency: patent  Nausea & Vomiting: no vomiting and no nausea  Complications: no  Cardiovascular status: hemodynamically stable  Respiratory status: acceptable  Hydration status: stable  Comments: Patient s/p Spinal for C/S. Pt denies residual numbness post block. Patient is ambulating and voiding without difficulty. Patient denies back pain, headache, paresthesias, pruritus or n/v.  Spinal site is free of signs of infection.   Multimodal analgesia pain management approach

## 2022-09-28 NOTE — PROGRESS NOTES
Postpartum note  [facility]     Subjective:     Postoperative Day 1: No concerns    The patient feels well. Pain is well controlled with current medications. The baby is well. Baby is feeding via breast. Jiménez removed 3 hours ago, has not urinated since. Urinary output via Jiménez was adequate. The patient is ambulating well. The patient is tolerating a normal diet. Flatus has been passed, no BM.     Lochia: Normal      Objective:      Vitals:    09/27/22 1823 09/27/22 2000 09/27/22 2349 09/28/22 0400   BP: 107/65 113/69 119/74 108/70   Pulse: 75 83 98 83   Resp: 18 18 16 16   Temp: 97.9 °F (36.6 °C) 97.5 °F (36.4 °C) 97.6 °F (36.4 °C) 97.7 °F (36.5 °C)   TempSrc: Oral Oral Oral Oral   SpO2: 97% 97% 97% 98%   Weight:       Height:         Patient Vitals for the past 8 hrs:   BP Temp Temp src Pulse Resp SpO2   09/28/22 0400 108/70 97.7 °F (36.5 °C) Oral 83 16 98 %       General:    alert, appears stated age, and cooperative   Abdomen:    Uterine fundus firm, fundus below umbilicus   Incision:  healing well, no significant drainage, no dehiscence, no significant erythema   Cardio:  RRR   Lungs:  CTA   DVT Evaluation:  No evidence of DVT seen on physical exam.       Lab Results   Component Value Date    WBC 10.1 09/28/2022    HGB 10.1 (L) 09/28/2022    HCT 30.5 (L) 09/28/2022    MCV 83.9 09/28/2022     09/28/2022          Current Facility-Administered Medications:     lactated ringers infusion, , IntraVENous, Continuous, Danna BALLARD MD, Last Rate: 125 mL/hr at 09/27/22 2304, Rate Verify at 09/27/22 2304    sodium chloride flush 0.9 % injection 5-40 mL, 5-40 mL, IntraVENous, 2 times per day, Danna BALLARD MD, 10 mL at 09/28/22 0848    sodium chloride flush 0.9 % injection 5-40 mL, 5-40 mL, IntraVENous, PRN, Tavon BALLARD MD    0.9 % sodium chloride infusion, , IntraVENous, PRN, Danna BALLARD MD    carboprost (HEMABATE) injection 250 mcg, 250 mcg, IntraMUSCular, PRN, Danna Ragland MD ELIO    methylergonovine (METHERGINE) injection 200 mcg, 200 mcg, IntraMUSCular, PRN, Tavon BALLARD MD    miSOPROStol (CYTOTEC) tablet 800 mcg, 800 mcg, Rectal, PRN, Ronaldo Breaux MD    miSOPROStol (CYTOTEC) tablet 200 mcg, 200 mcg, Buccal, PRN, Ronaldo Breaux MD    acetaminophen (TYLENOL) tablet 1,000 mg, 1,000 mg, Oral, Q8H, Tavon BALLARD MD, 1,000 mg at 09/28/22 0352    [START ON 10/1/2022] ibuprofen (ADVIL;MOTRIN) tablet 800 mg, 800 mg, Oral, Q8H, Tavon BALLARD MD    ketorolac (TORADOL) injection 30 mg, 30 mg, IntraVENous, Q8H, Tavon BALLARD MD, 30 mg at 09/28/22 0902    oxyCODONE (ROXICODONE) immediate release tablet 5 mg, 5 mg, Oral, Q4H PRN **OR** oxyCODONE (ROXICODONE) immediate release tablet 10 mg, 10 mg, Oral, Q4H PRN, Susana BALLARD MD    famotidine (PEPCID) tablet 20 mg, 20 mg, Oral, BID PRN, Ronaldo Breaux MD    rho(D) immune globulin (HYPERRHO S/D) injection 300 mcg, 300 mcg, IntraMUSCular, Once, Ronaldo Breaux MD    ondansetron (ZOFRAN) injection 4 mg, 4 mg, IntraVENous, Q6H PRN, Susana BALLARD MD, 4 mg at 09/27/22 1600    ondansetron (ZOFRAN-ODT) disintegrating tablet 8 mg, 8 mg, Oral, Q8H PRN, Susana BALLARD MD    diphenhydrAMINE (BENADRYL) injection 25 mg, 25 mg, IntraVENous, Q6H PRN, Susana BALALRD MD    simethicone (MYLICON) chewable tablet 80 mg, 80 mg, Oral, Q6H PRN, Susana BALLARD MD    docusate sodium (COLACE) capsule 100 mg, 100 mg, Oral, BID, Susana BALLARD MD, 100 mg at 09/28/22 0848    polyethylene glycol (GLYCOLAX) packet 17 g, 17 g, Oral, Daily, Susana BALLARD MD, 17 g at 09/28/22 0848    magnesium hydroxide (MILK OF MAGNESIA) 400 MG/5ML suspension 30 mL, 30 mL, Oral, Daily PRN, Susana BALLARD MD    bisacodyl (DULCOLAX) suppository 10 mg, 10 mg, Rectal, Daily PRN, Ronaldo Breaux MD    lansinoh lanolin ointment, , Topical, Q1H PRN, Susana BALLARD MD    ferrous sulfate (IRON 325) tablet 325 mg, 325 mg, Oral, Daily with breakfast, Tavon BALLARD MD    measles, mumps & rubella vaccine (MMR) injection 0.5 mL, 0.5 mL, SubCUTAneous, Prior to discharge, Vianey Guerrero MD    Tetanus-Diphth-Acell Pertussis (BOOSTRIX) injection 0.5 mL, 0.5 mL, IntraMUSCular, Prior to discharge, Vianey Guerrero MD     Assessment:     Status post repeat scheduled LTCS. Doing well postoperatively. Plan:     Routine postpartum care  Encourage ambulation, IS use, abdominal binder use  Discharge home in 2-3 days with standard precautions and return to clinic in 4-6 weeks.        Clement Newton  [address] [facility]

## 2022-09-28 NOTE — LACTATION NOTE
This note was copied from a baby's chart. Lactation Progress Note      Data:   RN to [facility] office. RN states BS was 44 and she is sending to lab. RN expressed concerns of short feeds. Mother states she pumped for one year with her first baby. Mother is trying to latch sleeping NB in cross cradle hold to her right nipple. Mother states she has more difficulty latching NB to right breast. Hospital pacifier noted in crib. Breast pump at bedside. Mother states NB was very spitty during the night and sleepy. Mother states NB was just 25 hrs old at 12nn. Action: ROSETTE dicussed BS 44 at this time and is only slightly low. LC discussed normal NB first 24 hrs and normal NB feeding patterns. [facility] request to assist mother due to low BS. Mother agreed. Mother shown to remove NB's blankets and clothing and to fully awaken NB. NB awake, crying and rooting. Mother shown pillow and NB placement for football hold. Mother's right nipples is flat, but the skin around the nipple is elastic. NB was able to latch well using \"corner latch\". With gently stimulation NB feeding well. NB released satisfied. NB placed skin-to-skin and remained satisfied. ROSETTE dicussed and provided the following:  Normal NB first 24 hrs  Hunger Cues  Normal feeding and sleeping patterns. Protecting Breastfeeding [facility]  Nipple Shield [facility] card  [facility] Breastfeeding booklet  Kyra handout    Once back to the office [facility] looked up blood sugar from lab and it was 70. Mother informed. ROSETTE also spoke to [address] caring for mother and baby. At this time exclusive breastfeeding encouraged. Response: Mother voiced feeling reassured and denies further needs at this time.

## 2022-09-29 PROCEDURE — 1220000000 HC SEMI PRIVATE OB R&B

## 2022-09-29 PROCEDURE — 6370000000 HC RX 637 (ALT 250 FOR IP): Performed by: OBSTETRICS & GYNECOLOGY

## 2022-09-29 RX ADMIN — DOCUSATE SODIUM 100 MG: 100 CAPSULE, LIQUID FILLED ORAL at 19:15

## 2022-09-29 RX ADMIN — DOCUSATE SODIUM 100 MG: 100 CAPSULE, LIQUID FILLED ORAL at 11:49

## 2022-09-29 RX ADMIN — IBUPROFEN 800 MG: 800 TABLET, FILM COATED ORAL at 19:15

## 2022-09-29 RX ADMIN — ACETAMINOPHEN 1000 MG: 500 TABLET ORAL at 16:16

## 2022-09-29 RX ADMIN — IBUPROFEN 800 MG: 800 TABLET, FILM COATED ORAL at 11:49

## 2022-09-29 RX ADMIN — OXYCODONE 5 MG: 5 TABLET ORAL at 02:19

## 2022-09-29 RX ADMIN — IBUPROFEN 800 MG: 800 TABLET, FILM COATED ORAL at 01:24

## 2022-09-29 ASSESSMENT — PAIN DESCRIPTION - LOCATION
LOCATION: ABDOMEN
LOCATION: INCISION;ABDOMEN
LOCATION: INCISION

## 2022-09-29 ASSESSMENT — PAIN DESCRIPTION - DESCRIPTORS
DESCRIPTORS: DISCOMFORT
DESCRIPTORS: DISCOMFORT
DESCRIPTORS: DISCOMFORT;CRAMPING

## 2022-09-29 ASSESSMENT — PAIN SCALES - GENERAL
PAINLEVEL_OUTOF10: 8
PAINLEVEL_OUTOF10: 7
PAINLEVEL_OUTOF10: 5
PAINLEVEL_OUTOF10: 4
PAINLEVEL_OUTOF10: 4

## 2022-09-29 NOTE — PROGRESS NOTES
Postpartum note  [facility]     Subjective:     Postoperative Day 2:     The patient feels well. Pain is well controlled with current medications. The baby is well other than low blood sugar due to difficulty feeding. Baby is feeding via both breast and bottle. Urinary output is adequate. The patient is ambulating well. The patient is tolerating a normal diet. Flatus has been passed, no BM yet. No lightheadedness, HA, N/V, chest pain, SOB.      Lochia: Normal    Objective:      Vitals:    09/28/22 0400 09/28/22 0840 09/28/22 1725 09/29/22 0125   BP: 108/70 108/71 117/78 131/80   Pulse: 83 95 82 77   Resp: 16 18 18 20   Temp: 97.7 °F (36.5 °C) 97 °F (36.1 °C) 96.9 °F (36.1 °C) 97.7 °F (36.5 °C)   TempSrc: Oral Oral Oral Axillary   SpO2: 98% 98%     Weight:       Height:         Patient Vitals for the past 8 hrs:   BP Temp Temp src Pulse Resp   09/29/22 0125 131/80 97.7 °F (36.5 °C) Axillary 77 20       General:    alert, appears stated age, and cooperative   Abdomen:    Uterine fundus firm, below umbilicus   Incision:  healing well, no significant drainage, no dehiscence, no significant erythema   Cardio:  RRR   Pulmonary: CTA   DVT Evaluation:  No evidence of DVT seen on physical exam.       Lab Results   Component Value Date    WBC 10.1 09/28/2022    HGB 10.1 (L) 09/28/2022    HCT 30.5 (L) 09/28/2022    MCV 83.9 09/28/2022     09/28/2022          Current Facility-Administered Medications:     ibuprofen (ADVIL;MOTRIN) tablet 800 mg, 800 mg, Oral, Q8H, Shelly BALLARD MD, 800 mg at 09/29/22 0124    lactated ringers infusion, , IntraVENous, Continuous, Gurwinder BALLARD MD, Last Rate: 125 mL/hr at 09/27/22 2304, Rate Verify at 09/27/22 2304    0.9 % sodium chloride infusion, , IntraVENous, PRN, Gurwinder BALLARD MD    carboprost (HEMABATE) injection 250 mcg, 250 mcg, IntraMUSCular, PRN, Gurwinder BALLARD MD    methylergonovine (METHERGINE) injection 200 mcg, 200 mcg, IntraMUSCular, PRN, Tavon Nadien Snyder MD    miSOPROStol (CYTOTEC) tablet 800 mcg, 800 mcg, Rectal, PRN, Tavon BALLARD MD    miSOPROStol (CYTOTEC) tablet 200 mcg, 200 mcg, Buccal, PRN, Amy BALLARD MD    acetaminophen (TYLENOL) tablet 1,000 mg, 1,000 mg, Oral, Q8H, Tavon BALLARD MD, 1,000 mg at 09/28/22 1953    oxyCODONE (ROXICODONE) immediate release tablet 5 mg, 5 mg, Oral, Q4H PRN, 5 mg at 09/29/22 0219 **OR** oxyCODONE (ROXICODONE) immediate release tablet 10 mg, 10 mg, Oral, Q4H PRN, Amy BALLARD MD    famotidine (PEPCID) tablet 20 mg, 20 mg, Oral, BID PRN, Amy BALLARD MD    rho(D) immune globulin (HYPERRHO S/D) injection 300 mcg, 300 mcg, IntraMUSCular, Once, Patrick Rodriguez MD    ondansetron (ZOFRAN) injection 4 mg, 4 mg, IntraVENous, Q6H PRN, Amy BALLARD MD, 4 mg at 09/27/22 1600    ondansetron (ZOFRAN-ODT) disintegrating tablet 8 mg, 8 mg, Oral, Q8H PRN, Amy BALLARD MD    diphenhydrAMINE (BENADRYL) injection 25 mg, 25 mg, IntraVENous, Q6H PRN, Amy BALLARD MD    simethicone (MYLICON) chewable tablet 80 mg, 80 mg, Oral, Q6H PRN, Amy BALLARD MD    docusate sodium (COLACE) capsule 100 mg, 100 mg, Oral, BID, Amy BALLARD MD, 100 mg at 09/28/22 2029    polyethylene glycol (GLYCOLAX) packet 17 g, 17 g, Oral, Daily, Amy BALLARD MD, 17 g at 09/28/22 0848    magnesium hydroxide (MILK OF MAGNESIA) 400 MG/5ML suspension 30 mL, 30 mL, Oral, Daily PRN, Amy BALLARD MD    bisacodyl (DULCOLAX) suppository 10 mg, 10 mg, Rectal, Daily PRN, Patrick Rodriguez MD    lansinoh lanolin ointment, , Topical, Q1H PRN, Amy BALLARD MD    ferrous sulfate (IRON 325) tablet 325 mg, 325 mg, Oral, Daily with breakfast, Tavon BALLARD MD    measles, mumps & rubella vaccine (MMR) injection 0.5 mL, 0.5 mL, SubCUTAneous, Prior to discharge, Patrick Rodriguez MD    Tetanus-Diphth-Acell Pertussis (BOOSTRIX) injection 0.5 mL, 0.5 mL, IntraMUSCular, Prior to discharge, Enid Isaac MD     Assessment:     Status post schedule C/S POD2. Doing well postoperatively. Plan:     Routine postpartum care  Encourage ambulation with abdominal binder, IS use  Discharge home today or tomorrow once baby is cleared by pediatrics with standard precautions and return to clinic in 4-6 weeks.       Abdelrahman Grimm  [address] [facility]  [facility]N [facility]

## 2022-09-29 NOTE — PROGRESS NOTES
[facility] post-partum note    Post-partum Day #2    Subjective:  Pain is : None  Lochia: staining only  Nausea: None  Bowel Movement/Flatus:  yes  Breastfeeding: plans to breastfeed    Objective:Patient Vitals for the past 24 hrs:   BP Temp Temp src Pulse Resp   09/29/22 0820 114/81 97.7 °F (36.5 °C) Oral 86 18   09/29/22 0125 131/80 97.7 °F (36.5 °C) Axillary 77 20   09/28/22 1725 117/78 96.9 °F (36.1 °C) Oral 82 18      Abdominal exam: soft, nontender, nondistended, no masses or organomegaly.      Wound: well approximated incision        Lab Results   Component Value Date    WBC 10.1 09/28/2022    HGB 10.1 (L) 09/28/2022    HCT 30.5 (L) 09/28/2022    MCV 83.9 09/28/2022     09/28/2022          Current Facility-Administered Medications:     ibuprofen (ADVIL;MOTRIN) tablet 800 mg, 800 mg, Oral, Q8H, Shelly BALLARD MD, 800 mg at 09/29/22 0124    lactated ringers infusion, , IntraVENous, Continuous, Amaris BALLARD MD, Last Rate: 125 mL/hr at 09/27/22 2304, Rate Verify at 09/27/22 2304    0.9 % sodium chloride infusion, , IntraVENous, PRN, Amaris BALLARD MD    carboprost (HEMABATE) injection 250 mcg, 250 mcg, IntraMUSCular, PRN, Amaris BALALRD MD    methylergonovine (METHERGINE) injection 200 mcg, 200 mcg, IntraMUSCular, PRN, Tavon BALLARD MD    miSOPROStol (CYTOTEC) tablet 800 mcg, 800 mcg, Rectal, PRN, Tavon BALLARD MD    miSOPROStol (CYTOTEC) tablet 200 mcg, 200 mcg, Buccal, PRN, Amaris BALLARD MD    acetaminophen (TYLENOL) tablet 1,000 mg, 1,000 mg, Oral, Q8H, Tavon BALLARD MD, 1,000 mg at 09/28/22 1953    oxyCODONE (ROXICODONE) immediate release tablet 5 mg, 5 mg, Oral, Q4H PRN, 5 mg at 09/29/22 0219 **OR** oxyCODONE (ROXICODONE) immediate release tablet 10 mg, 10 mg, Oral, Q4H PRN, Amaris BALLARD MD    famotidine (PEPCID) tablet 20 mg, 20 mg, Oral, BID PRN, Amaris BALLARD MD    rho(D) immune globulin (HYPERRHO S/D) injection 300 mcg, 300 mcg, IntraMUSCular, Once, Vinicius Hays MD    ondansetron (ZOFRAN) injection 4 mg, 4 mg, IntraVENous, Q6H PRN, Kailey BALLARD MD, 4 mg at 09/27/22 1600    ondansetron (ZOFRAN-ODT) disintegrating tablet 8 mg, 8 mg, Oral, Q8H PRN, Kailey BALLARD MD    diphenhydrAMINE (BENADRYL) injection 25 mg, 25 mg, IntraVENous, Q6H PRN, Kailey BALLARD MD    simethicone (MYLICON) chewable tablet 80 mg, 80 mg, Oral, Q6H PRN, Kailey BALLARD MD    docusate sodium (COLACE) capsule 100 mg, 100 mg, Oral, BID, Kailey BALLARD MD, 100 mg at 09/28/22 2029    polyethylene glycol (GLYCOLAX) packet 17 g, 17 g, Oral, Daily, Kailey BALLARD MD 17 g at 09/28/22 0848    magnesium hydroxide (MILK OF MAGNESIA) 400 MG/5ML suspension 30 mL, 30 mL, Oral, Daily PRN, Kailey BALLARD MD    bisacodyl (DULCOLAX) suppository 10 mg, 10 mg, Rectal, Daily PRN, Vinicius Hays MD    lansinoh lanolin ointment, , Topical, Q1H PRN, Kailey BALLARD MD    ferrous sulfate (IRON 325) tablet 325 mg, 325 mg, Oral, Daily with breakfast, Tavon BALLARD MD    measles, mumps & rubella vaccine (MMR) injection 0.5 mL, 0.5 mL, SubCUTAneous, Prior to discharge, Vinicius Hays MD    Tetanus-Diphth-Acell Pertussis (BOOSTRIX) injection 0.5 mL, 0.5 mL, IntraMUSCular, Prior to discharge, Vinicius Hays MD     Assessment/Plan:      Continue Postpartum care  Discharge today: no  Follow up: 2 weeks

## 2022-09-29 NOTE — PLAN OF CARE
Problem: Discharge Planning  Goal: Discharge to home or other facility with appropriate resources  Recent Flowsheet Documentation  Taken 9/29/2022 0125 by Marivel Pyle RN  Discharge to home or other facility with appropriate resources: Identify barriers to discharge with patient and caregiver     Problem: Infection - Adult  Goal: Absence of infection at discharge  9/29/2022 0200 by Marivel Pyle RN  Outcome: Progressing  9/29/2022 0200 by Marivel Pyle RN  Outcome: Progressing  Flowsheets (Taken 9/29/2022 0125)  Absence of infection at discharge: Assess and monitor for signs and symptoms of infection  Goal: Absence of infection during hospitalization  Outcome: Progressing     Problem: Pain  Goal: Verbalizes/displays adequate comfort level or baseline comfort level  9/29/2022 0200 by Marivel Pyle RN  Outcome: Progressing  9/29/2022 0200 by Marivel Pyle RN  Outcome: Progressing  Flowsheets (Taken 9/29/2022 0125)  Verbalizes/displays adequate comfort level or baseline comfort level:   Encourage patient to monitor pain and request assistance   Assess pain using appropriate pain scale

## 2022-09-30 VITALS
DIASTOLIC BLOOD PRESSURE: 88 MMHG | TEMPERATURE: 97.3 F | BODY MASS INDEX: 31.39 KG/M2 | HEIGHT: 67 IN | WEIGHT: 200 LBS | RESPIRATION RATE: 16 BRPM | OXYGEN SATURATION: 98 % | HEART RATE: 67 BPM | SYSTOLIC BLOOD PRESSURE: 126 MMHG

## 2022-09-30 PROCEDURE — 6370000000 HC RX 637 (ALT 250 FOR IP): Performed by: OBSTETRICS & GYNECOLOGY

## 2022-09-30 RX ADMIN — POLYETHYLENE GLYCOL 3350 17 G: 17 POWDER, FOR SOLUTION ORAL at 08:33

## 2022-09-30 RX ADMIN — ACETAMINOPHEN 1000 MG: 500 TABLET ORAL at 00:30

## 2022-09-30 RX ADMIN — ACETAMINOPHEN 1000 MG: 500 TABLET ORAL at 16:57

## 2022-09-30 RX ADMIN — DOCUSATE SODIUM 100 MG: 100 CAPSULE, LIQUID FILLED ORAL at 08:33

## 2022-09-30 RX ADMIN — IBUPROFEN 800 MG: 800 TABLET, FILM COATED ORAL at 08:33

## 2022-09-30 RX ADMIN — IBUPROFEN 800 MG: 800 TABLET, FILM COATED ORAL at 03:55

## 2022-09-30 ASSESSMENT — PAIN SCALES - GENERAL
PAINLEVEL_OUTOF10: 3
PAINLEVEL_OUTOF10: 4
PAINLEVEL_OUTOF10: 5

## 2022-09-30 ASSESSMENT — PAIN DESCRIPTION - DESCRIPTORS
DESCRIPTORS: ACHING;CRAMPING
DESCRIPTORS: STABBING

## 2022-09-30 ASSESSMENT — PAIN - FUNCTIONAL ASSESSMENT
PAIN_FUNCTIONAL_ASSESSMENT: ACTIVITIES ARE NOT PREVENTED
PAIN_FUNCTIONAL_ASSESSMENT: ACTIVITIES ARE NOT PREVENTED

## 2022-09-30 ASSESSMENT — PAIN DESCRIPTION - LOCATION
LOCATION: ABDOMEN;INCISION
LOCATION: ABDOMEN;INCISION

## 2022-09-30 ASSESSMENT — PAIN DESCRIPTION - ORIENTATION
ORIENTATION: OUTER
ORIENTATION: LOWER

## 2022-09-30 NOTE — FLOWSHEET NOTE
Patient did not have prescription medication ordered by OB. This RN offered to get prescription medication written for patient before patient was discharged. Patient declined.

## 2022-09-30 NOTE — FLOWSHEET NOTE
Postpartum and infant care teaching completed and forms signed by patient. Copy witnessed by RN and given to patient. Patient verbalized understanding of all teaching points. Patient plans to follow-up with [facility] Provider as instructed. Patient verbalizes understanding of discharge instructions and denies further questions. ID bands checked. Mother's ID band and one of baby's ID bands removed and taped to footprint sheet, signed by patient and witnessed by RN. Patient discharged in stable condition accompanied by family/guardian. Discharged in wheelchair, holding baby in arms.

## 2022-09-30 NOTE — DISCHARGE INSTRUCTIONS
Thank you for the opportunity to care for you and your family. We hope that you are happy with the care we provided during your stay in the [facility]. We want to ensure that you have the help that you need when you leave the hospital. If there is anything that we can assist you with please let us know. Breastfeeding mothers may contact our lactation specialists with any problems or questions. [facility] services phone number is [phone]. Leave a message and your call will be returned. Please refer to the information provided in the \"Caring for Yourself\" tab in your discharge binder (Guidelines for [employer]). The following are warning signs to remember. Call 911 if you have:    Chest pain or pressure  Shortness of breath, even at rest  Thoughts of hurting yourself or your baby  Seizures    Call your healthcare provider if you have:    Temperature of 100.4 degrees or higher  Stitches that are not healing             --Swelling, bleeding, drainage, foul odor, redness or warmth in/around your                 stitches, staples, or incision (scar)               -- Bad smelling blood or discharge from the vagina  Vaginal bleeding that has increased             --Soaking through one pad in an hour             --You are passing clots larger than the size of a lemon. Red, warm tender area(s) in your breast or calf. Headache that does not get better, even after taking medicine; or headache with vision changes    Remember to notify all healthcare providers from your date of delivery up to one year after birth! CARING FOR YOURSELF      DIET/ACTIVITY    Eat a well balanced diet focusing on food high in fiber and protein. Drink plenty of fluids, especially water. To avoid constipation you may take a mild stool softener as recommended by your doctor or midwife. Gradually increase your activity.  Resume an exercise regime only after being advised by your doctor or midwife. When sitting or lying down, keep your legs elevated to reduce swelling. Avoid lifting anything heavier than your baby or a gallon of milk. Avoid driving for two weeks or while taking narcotics. No sexual intercourse for 6 weeks, or until advised by your OB provider. Nothing in the vagina: intercourse, tampons or douching. Be prepared to discuss family planning at your follow up OB visit. If you feel tired and have a lack of energy, you may continue to take your prenatal vitamins. Nap when your baby naps to catch up on sleep. EMOTIONS    You may feel villagomez, sad, teary and overwhelmed. Contact your OB provider if you think you may be showing signs of postpartum depression. Please refer to the \"Going Home\" tab in your discharge folder. If your baby will not stop crying, contact another adult to help or place the baby in its crib on its back and take a break. Never shake your baby! INCISIONAL/PANKAJ CARE    Clean your incision in the shower with mild soap. After shower pat the incision area dry and allow it to be open to the air. If used, steri strips should be removed by 2 weeks. If you are discharged with staples in place, call your OB provider's office to schedule removal.  Vaginal bleeding will decrease in amount over the next few weeks. Cleanse your perineum from front to back using the pankaj bottle, after toileting, until bleeding stops. You will notice that as your activity increases, your flow may also increase. This is a sign that you need to rest more often. Call your OB provider if you are saturating more than one maxi pad in an hour and rest does not help. BREAST CARE    FOR BREASTFEEDING MOMS:    If you become engorged, feeding may be more difficult or painful for 1 to 2 days. You may find it helpful to hand express some milk so that the infant can latch more easily. While breastfeeding continue to take your prenatal vitamins as directed.   Refer to the breastfeeding information in the discharge folder. FOR NON-BREASTFEEDING MOMS:    You may apply ice packs to your breasts, over your bra, for 20 minutes at a time for comfort. Do not express breast milk. Avoid stimulation to your breasts. When showering, allow the water to strike only your back. Wear a good fitting bra, such as a sports bra, until your milk dries up. OTHER REASONS TO CALL YOUR DOCTOR    Your abdomen is tender to touch or you have pain that cannot be relieved. Flu-like symptoms such as achy muscles and joints or you are experiencing extreme weakness or dizziness. Persistent burning or increased urgency in urination. CALL YOUR DOCTOR IF EXPERIENCING ANY OF THE FOLLOWIN. If you feel you are not getting better or you are concerned. 2.  If you develop a headache, not resolved with your usual interventions. 3.  If you have changes in your vision, (blurring, blind spots, flashes of light, squiggly lines or loss of vision.)  4. If you have pain in your abdomen, up by your ribs, or nausea and vomiting. 5.  New shortness of breath or chest pain. 6.  If you have been instructed by your doctor to monitor you blood pressures, call for blood pressure over 160/100.  7.  Swelling is normal after delivery. If swelling is increasing, especially in your hands and face, along with one of the other symptoms, call your doctor.

## 2022-09-30 NOTE — PROGRESS NOTES
Postpartum note  [facility]     Subjective:     Postoperative Day 3:     The patient feels well. Pain is well controlled with current medications. The baby is having difficulty feeding with low blood sugar, currently receiving light therapy- otherwise well. Baby is feeding via both breast and bottle as baby is having difficulty feeding. Urinary output is adequate. The patient is ambulating well. The patient is tolerating a normal diet. Flatus has been passed. No BM yet. No lightheadedness, SOB, chest pain, N/V.      Lochia: Normal    Objective:      Vitals:    09/29/22 0125 09/29/22 0820 09/29/22 1609 09/30/22 0030   BP: 131/80 114/81 122/74 (!) 102/56   Pulse: 77 86 90 90   Resp: 20 18  18   Temp: 97.7 °F (36.5 °C) 97.7 °F (36.5 °C) 97.7 °F (36.5 °C) 97.5 °F (36.4 °C)   TempSrc: Axillary Oral Oral Temporal   SpO2:       Weight:       Height:         Patient Vitals for the past 8 hrs:   BP Temp Temp src Pulse Resp   09/30/22 0030 (!) 102/56 97.5 °F (36.4 °C) Temporal 90 18       General:    alert, appears stated age, and cooperative   Abdomen:    Uterine fundus firm, below umbilicus   Incision:  healing well, no significant drainage, no dehiscence, no significant erythema   Cardio:  RRR   Pulmonary:  CTA   DVT Evaluation:  No evidence of DVT seen on physical exam.       Lab Results   Component Value Date    WBC 10.1 09/28/2022    HGB 10.1 (L) 09/28/2022    HCT 30.5 (L) 09/28/2022    MCV 83.9 09/28/2022     09/28/2022          Current Facility-Administered Medications:     ibuprofen (ADVIL;MOTRIN) tablet 800 mg, 800 mg, Oral, Q8H, Shelly BALLARD MD, 800 mg at 09/30/22 0355    lactated ringers infusion, , IntraVENous, Continuous, Claudia BALLARD MD, Last Rate: 125 mL/hr at 09/27/22 2304, Rate Verify at 09/27/22 2304    0.9 % sodium chloride infusion, , IntraVENous, PRN, Claudia BALLARD MD    carboprost (HEMABATE) injection 250 mcg, 250 mcg, IntraMUSCular, PRN, Janet Pandya MD methylergonovine (METHERGINE) injection 200 mcg, 200 mcg, IntraMUSCular, PRN, Tvaon BALLARD MD    miSOPROStol (CYTOTEC) tablet 800 mcg, 800 mcg, Rectal, PRN, Regla BALLARD MD    miSOPROStol (CYTOTEC) tablet 200 mcg, 200 mcg, Buccal, PRN, Regla BALLARD MD    acetaminophen (TYLENOL) tablet 1,000 mg, 1,000 mg, Oral, Q8H, Tavon BALLARD MD, 1,000 mg at 09/30/22 0030    oxyCODONE (ROXICODONE) immediate release tablet 5 mg, 5 mg, Oral, Q4H PRN, 5 mg at 09/29/22 0219 **OR** oxyCODONE (ROXICODONE) immediate release tablet 10 mg, 10 mg, Oral, Q4H PRN, Regla BALLARD MD    famotidine (PEPCID) tablet 20 mg, 20 mg, Oral, BID PRN, Regla BALLARD MD    rho(D) immune globulin (HYPERRHO S/D) injection 300 mcg, 300 mcg, IntraMUSCular, Once, Luis Angel Medellin MD    ondansetron (ZOFRAN) injection 4 mg, 4 mg, IntraVENous, Q6H PRN, Regla BALLARD MD, 4 mg at 09/27/22 1600    ondansetron (ZOFRAN-ODT) disintegrating tablet 8 mg, 8 mg, Oral, Q8H PRN, Regla BALLARD MD    diphenhydrAMINE (BENADRYL) injection 25 mg, 25 mg, IntraVENous, Q6H PRN, Regla BALLARD MD    simethicone (MYLICON) chewable tablet 80 mg, 80 mg, Oral, Q6H PRN, Regla BALLARD MD    docusate sodium (COLACE) capsule 100 mg, 100 mg, Oral, BID, Regla BALLARD MD, 100 mg at 09/29/22 1915    polyethylene glycol (GLYCOLAX) packet 17 g, 17 g, Oral, Daily, Regla BALLARD MD, 17 g at 09/28/22 0848    magnesium hydroxide (MILK OF MAGNESIA) 400 MG/5ML suspension 30 mL, 30 mL, Oral, Daily PRN, Regla BALLARD MD    bisacodyl (DULCOLAX) suppository 10 mg, 10 mg, Rectal, Daily PRN, Luis Angel Medellin MD    lansinoh lanolin ointment, , Topical, Q1H PRN, Regla BALLARD MD    ferrous sulfate (IRON 325) tablet 325 mg, 325 mg, Oral, Daily with breakfast, Tavon BALLARD MD    measles, mumps & rubella vaccine (MMR) injection 0.5 mL, 0.5 mL, SubCUTAneous, Prior to discharge, Luis Angel Medellin MD Tetanus-Diphth-Acell Pertussis (BOOSTRIX) injection 0.5 mL, 0.5 mL, IntraMUSCular, Prior to discharge, Janet Pandya MD     Assessment:     Status post schedule RCS. Doing well postoperatively. Plan:     Routine postpartum care  Discharge home tomorrow with standard precautions and return to clinic in 4-6 weeks.       Yamil Mohan  [address] [facility]  OB/GYN Clerkship

## 2023-07-24 NOTE — ED NOTES
Report given to PACU nurse. Taken to surgery now by lauri.       Davide Donald RN  10/01/21 0869 Nsaids Pregnancy And Lactation Text: These medications are considered safe up to 30 weeks gestation. It is excreted in breast milk.

## 2025-07-08 SDOH — HEALTH STABILITY: PHYSICAL HEALTH: ON AVERAGE, HOW MANY MINUTES DO YOU ENGAGE IN EXERCISE AT THIS LEVEL?: PATIENT DECLINED

## 2025-07-08 SDOH — HEALTH STABILITY: PHYSICAL HEALTH
ON AVERAGE, HOW MANY DAYS PER WEEK DO YOU ENGAGE IN MODERATE TO STRENUOUS EXERCISE (LIKE A BRISK WALK)?: PATIENT DECLINED

## 2025-07-09 ENCOUNTER — OFFICE VISIT (OUTPATIENT)
Dept: INTERNAL MEDICINE CLINIC | Age: 30
End: 2025-07-09
Payer: COMMERCIAL

## 2025-07-09 VITALS
DIASTOLIC BLOOD PRESSURE: 76 MMHG | BODY MASS INDEX: 28.25 KG/M2 | HEART RATE: 103 BPM | HEIGHT: 67 IN | WEIGHT: 180 LBS | SYSTOLIC BLOOD PRESSURE: 106 MMHG | OXYGEN SATURATION: 99 %

## 2025-07-09 DIAGNOSIS — Z80.3 FAMILY HISTORY OF BREAST CANCER: ICD-10-CM

## 2025-07-09 DIAGNOSIS — Z80.0 FAMILY HISTORY OF COLON CANCER: ICD-10-CM

## 2025-07-09 DIAGNOSIS — R22.1 NECK SWELLING: ICD-10-CM

## 2025-07-09 DIAGNOSIS — R22.1 NECK SWELLING: Primary | ICD-10-CM

## 2025-07-09 DIAGNOSIS — R73.09 ELEVATED GLUCOSE: ICD-10-CM

## 2025-07-09 DIAGNOSIS — E04.9 ENLARGED THYROID: ICD-10-CM

## 2025-07-09 PROCEDURE — G8419 CALC BMI OUT NRM PARAM NOF/U: HCPCS | Performed by: INTERNAL MEDICINE

## 2025-07-09 PROCEDURE — 99203 OFFICE O/P NEW LOW 30 MIN: CPT | Performed by: INTERNAL MEDICINE

## 2025-07-09 PROCEDURE — G8427 DOCREV CUR MEDS BY ELIG CLIN: HCPCS | Performed by: INTERNAL MEDICINE

## 2025-07-09 PROCEDURE — 1036F TOBACCO NON-USER: CPT | Performed by: INTERNAL MEDICINE

## 2025-07-09 RX ORDER — AZITHROMYCIN 250 MG/1
TABLET, FILM COATED ORAL
Qty: 6 TABLET | Refills: 0 | Status: SHIPPED | OUTPATIENT
Start: 2025-07-09 | End: 2025-07-19

## 2025-07-09 SDOH — ECONOMIC STABILITY: FOOD INSECURITY: WITHIN THE PAST 12 MONTHS, THE FOOD YOU BOUGHT JUST DIDN'T LAST AND YOU DIDN'T HAVE MONEY TO GET MORE.: NEVER TRUE

## 2025-07-09 SDOH — ECONOMIC STABILITY: FOOD INSECURITY: WITHIN THE PAST 12 MONTHS, YOU WORRIED THAT YOUR FOOD WOULD RUN OUT BEFORE YOU GOT MONEY TO BUY MORE.: NEVER TRUE

## 2025-07-09 ASSESSMENT — ENCOUNTER SYMPTOMS
TROUBLE SWALLOWING: 0
EYE DISCHARGE: 0
COLOR CHANGE: 0
COUGH: 0
VOMITING: 0
ABDOMINAL PAIN: 0
WHEEZING: 0
NAUSEA: 0
VOICE CHANGE: 0
EYE PAIN: 0
BACK PAIN: 0
SHORTNESS OF BREATH: 0
RHINORRHEA: 0

## 2025-07-09 ASSESSMENT — PATIENT HEALTH QUESTIONNAIRE - PHQ9
1. LITTLE INTEREST OR PLEASURE IN DOING THINGS: SEVERAL DAYS
SUM OF ALL RESPONSES TO PHQ QUESTIONS 1-9: 2
2. FEELING DOWN, DEPRESSED OR HOPELESS: SEVERAL DAYS

## 2025-07-09 NOTE — PROGRESS NOTES
Destinee Antoineina Arron  1995  female  30 y.o.    SUBJECTIVE:    Chief Complaint   Patient presents with    New Patient     Patient is here for a new patient appointment, complaints of swelling on the right side of neck, this has been an ongoing issue but has not had it checked out.       History of Present Illness  The patient presents for evaluation of neck swelling.    She first noticed the swelling earlier this year, with a sensation of pressure when swallowing, but no pain or dysphagia. Currently menstruating, uses an IUD for contraception, and reports no joint pain. Weight loss since last childbirth is not concerning. No changes in bowel movements. 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 Anxiety during doctor visits.    History of stress, anxiety, and ADHD, not on medication. Blood work three years ago showed low hemoglobin, low sodium, and elevated blood sugar. Mother has breast cancer and colon cancer. No other known family cancers.  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    FAMILY HISTORY  Mother has breast cancer, colon cancer, and diabetes. No other known family cancers.         Past Medical History:   Diagnosis Date    Abnormal Pap smear of cervix     ADHD (attention deficit hyperactivity disorder)     Anxiety     Headache     History of colposcopy 2019    Mental disorder     anxiety/depression. no meds     Past Surgical History:   Procedure Laterality Date     SECTION N/A 3/27/2020     SECTION performed by Alcira Barakat MD at [facility]     SECTION N/A 2022     SECTION performed by Tavon BALLARD MD at [facility] OR    DILATION AND CURETTAGE OF UTERUS N/A 10/1/2021    DILATATION AND CURETTAGE SUCTION performed by Tavon BALLARD MD at [facility] OR    WISDOM TOOTH EXTRACTION       Social

## 2025-07-10 LAB
ALBUMIN SERPL-MCNC: 4.6 G/DL (ref 3.4–5)
ALBUMIN/GLOB SERPL: 2 {RATIO} (ref 1.1–2.2)
ALP SERPL-CCNC: 80 U/L (ref 40–129)
ALT SERPL-CCNC: 15 U/L (ref 10–40)
ANION GAP SERPL CALCULATED.3IONS-SCNC: 10 MMOL/L (ref 3–16)
AST SERPL-CCNC: 15 U/L (ref 15–37)
BILIRUB SERPL-MCNC: 0.8 MG/DL (ref 0–1)
BUN SERPL-MCNC: 10 MG/DL (ref 7–20)
CALCIUM SERPL-MCNC: 9.7 MG/DL (ref 8.3–10.6)
CHLORIDE SERPL-SCNC: 103 MMOL/L (ref 99–110)
CO2 SERPL-SCNC: 24 MMOL/L (ref 21–32)
CREAT SERPL-MCNC: 0.7 MG/DL (ref 0.6–1.1)
DEPRECATED RDW RBC AUTO: 13 % (ref 12.4–15.4)
EST. AVERAGE GLUCOSE BLD GHB EST-MCNC: 105.4 MG/DL
GFR SERPLBLD CREATININE-BSD FMLA CKD-EPI: >90 ML/MIN/{1.73_M2}
GLUCOSE SERPL-MCNC: 108 MG/DL (ref 70–99)
HBA1C MFR BLD: 5.3 %
HCT VFR BLD AUTO: 40.2 % (ref 36–48)
HGB BLD-MCNC: 13.7 G/DL (ref 12–16)
MCH RBC QN AUTO: 29.4 PG (ref 26–34)
MCHC RBC AUTO-ENTMCNC: 34 G/DL (ref 31–36)
MCV RBC AUTO: 86.5 FL (ref 80–100)
PLATELET # BLD AUTO: 254 K/UL (ref 135–450)
PMV BLD AUTO: 9.2 FL (ref 5–10.5)
POTASSIUM SERPL-SCNC: 4.1 MMOL/L (ref 3.5–5.1)
PROT SERPL-MCNC: 6.9 G/DL (ref 6.4–8.2)
RBC # BLD AUTO: 4.65 M/UL (ref 4–5.2)
SODIUM SERPL-SCNC: 137 MMOL/L (ref 136–145)
TSH SERPL DL<=0.005 MIU/L-ACNC: 1.9 UIU/ML (ref 0.27–4.2)
WBC # BLD AUTO: 5.3 K/UL (ref 4–11)

## 2025-07-11 ENCOUNTER — HOSPITAL ENCOUNTER (OUTPATIENT)
Dept: ULTRASOUND IMAGING | Age: 30
Discharge: HOME | End: 2025-07-11
Payer: COMMERCIAL

## 2025-07-11 ENCOUNTER — TELEPHONE (OUTPATIENT)
Dept: INTERNAL MEDICINE CLINIC | Age: 30
End: 2025-07-11

## 2025-07-11 DIAGNOSIS — R22.1 NECK SWELLING: ICD-10-CM

## 2025-07-11 DIAGNOSIS — E04.9 ENLARGED THYROID: ICD-10-CM

## 2025-07-11 PROCEDURE — 76536 US EXAM OF HEAD AND NECK: CPT

## 2025-07-11 NOTE — TELEPHONE ENCOUNTER
Patient called regarding referral for Genetic Testing through [facility]. She stated that she called them this morning & they have not received the referral. I re faxed this referral to the number listed on the referral.

## 2025-08-05 ENCOUNTER — OFFICE VISIT (OUTPATIENT)
Dept: INTERNAL MEDICINE CLINIC | Age: 30
End: 2025-08-05
Payer: COMMERCIAL

## 2025-08-05 VITALS
HEART RATE: 105 BPM | BODY MASS INDEX: 28.41 KG/M2 | HEIGHT: 67 IN | OXYGEN SATURATION: 98 % | WEIGHT: 181 LBS | DIASTOLIC BLOOD PRESSURE: 76 MMHG | SYSTOLIC BLOOD PRESSURE: 110 MMHG

## 2025-08-05 DIAGNOSIS — Z80.3 FAMILY HISTORY OF BREAST CANCER: ICD-10-CM

## 2025-08-05 DIAGNOSIS — F41.9 ANXIETY: ICD-10-CM

## 2025-08-05 DIAGNOSIS — R59.0 SUBMANDIBULAR LYMPHADENOPATHY: ICD-10-CM

## 2025-08-05 DIAGNOSIS — R59.0 SUBMANDIBULAR LYMPHADENOPATHY: Primary | ICD-10-CM

## 2025-08-05 DIAGNOSIS — Z80.0 FAMILY HISTORY OF COLON CANCER: ICD-10-CM

## 2025-08-05 LAB
BILIRUB UR QL STRIP.AUTO: NEGATIVE
CLARITY UR: CLEAR
COLOR UR: YELLOW
GLUCOSE UR STRIP.AUTO-MCNC: NEGATIVE MG/DL
HGB UR QL STRIP.AUTO: NEGATIVE
KETONES UR STRIP.AUTO-MCNC: NEGATIVE MG/DL
LEUKOCYTE ESTERASE UR QL STRIP.AUTO: NEGATIVE
NITRITE UR QL STRIP.AUTO: NEGATIVE
PH UR STRIP.AUTO: 5.5 [PH] (ref 5–8)
PROT UR STRIP.AUTO-MCNC: NEGATIVE MG/DL
SP GR UR STRIP.AUTO: 1.02 (ref 1–1.03)
UA DIPSTICK W REFLEX MICRO PNL UR: NORMAL
URN SPEC COLLECT METH UR: NORMAL
UROBILINOGEN UR STRIP-ACNC: 0.2 E.U./DL

## 2025-08-05 PROCEDURE — 99213 OFFICE O/P EST LOW 20 MIN: CPT | Performed by: INTERNAL MEDICINE

## 2025-08-05 PROCEDURE — 1036F TOBACCO NON-USER: CPT | Performed by: INTERNAL MEDICINE

## 2025-08-05 PROCEDURE — G8419 CALC BMI OUT NRM PARAM NOF/U: HCPCS | Performed by: INTERNAL MEDICINE

## 2025-08-05 PROCEDURE — G8427 DOCREV CUR MEDS BY ELIG CLIN: HCPCS | Performed by: INTERNAL MEDICINE

## 2025-08-05 ASSESSMENT — ENCOUNTER SYMPTOMS
COUGH: 0
SORE THROAT: 0
WHEEZING: 0
VOMITING: 0
COLOR CHANGE: 0
NAUSEA: 0
ABDOMINAL PAIN: 0
VOICE CHANGE: 0
SHORTNESS OF BREATH: 0
RHINORRHEA: 0

## 2025-08-20 ENCOUNTER — OFFICE VISIT (OUTPATIENT)
Dept: ENT CLINIC | Age: 30
End: 2025-08-20
Payer: COMMERCIAL

## 2025-08-20 VITALS
OXYGEN SATURATION: 98 % | HEART RATE: 72 BPM | TEMPERATURE: 98.2 F | DIASTOLIC BLOOD PRESSURE: 58 MMHG | BODY MASS INDEX: 28.72 KG/M2 | SYSTOLIC BLOOD PRESSURE: 106 MMHG | HEIGHT: 67 IN | WEIGHT: 183 LBS

## 2025-08-20 DIAGNOSIS — R60.0 SUBMANDIBULAR GLAND SWELLING: ICD-10-CM

## 2025-08-20 DIAGNOSIS — R68.89 NECK PROBLEM: Primary | ICD-10-CM

## 2025-08-20 PROCEDURE — 1036F TOBACCO NON-USER: CPT | Performed by: OTOLARYNGOLOGY

## 2025-08-20 PROCEDURE — G8427 DOCREV CUR MEDS BY ELIG CLIN: HCPCS | Performed by: OTOLARYNGOLOGY

## 2025-08-20 PROCEDURE — G8419 CALC BMI OUT NRM PARAM NOF/U: HCPCS | Performed by: OTOLARYNGOLOGY

## 2025-08-20 PROCEDURE — 99204 OFFICE O/P NEW MOD 45 MIN: CPT | Performed by: OTOLARYNGOLOGY

## (undated) DEVICE — Z CONVERTED USE 2275207 CLOTH PREP W7.5XL7.5IN 2% CHG SKIN ALC AND RNS FREE

## (undated) DEVICE — BLADE CLIPPER GEN PURP NS

## (undated) DEVICE — WOUND RETRACTOR AND PROTECTOR: Brand: ALEXIS WOUND PROTECTOR-RETRACTOR

## (undated) DEVICE — CLOTH SURG PREP PREOPERATIVE CHLORHEXIDINE GLUC 2% READYPREP

## (undated) DEVICE — 3M™ STERI-STRIP™ COMPOUND BENZOIN TINCTURE 40 BAGS/CARTON 4 CARTONS/CASE C1544: Brand: 3M™ STERI-STRIP™

## (undated) DEVICE — APPLICATOR MEDICATED 26 CC SOLUTION HI LT ORNG CHLORAPREP

## (undated) DEVICE — Z DISCONTINUED USE 2659136 CANNULA VAC DIA12MM CRV SEMI RIG W/ ROUNDED TIP TAPR END

## (undated) DEVICE — 50" SINGLE PATIENT USE HOVERMATT: Brand: SINGLE PATIENT USE HOVERMATT

## (undated) DEVICE — SUTURE VCRL SZ 0 L36IN ABSRB UD L36MM CT-1 1/2 CIR J946H

## (undated) DEVICE — 9165 UNIVERSAL PATIENT PLATE: Brand: 3M™

## (undated) DEVICE — SUTURE VCRL SZ 2-0 L36IN ABSRB UD L36MM CT-1 1/2 CIR J945H

## (undated) DEVICE — GLOVE ORTHO 8   MSG9480

## (undated) DEVICE — SHEET,DRAPE,53X77,STERILE: Brand: MEDLINE

## (undated) DEVICE — SYSTEM COLL W/ TISS TRAP INCLUDE COLL CANSTR LID SET OF

## (undated) DEVICE — 3M™ STERI-STRIP™ REINFORCED ADHESIVE SKIN CLOSURES, R1549, 1/2 IN X 2 IN (12 MM X 50 MM), 6 STRIPS/ENVELOPE: Brand: 3M™ STERI-STRIP™

## (undated) DEVICE — TOWEL,OR,DSP,ST,BLUE,STD,4/PK,20PK/CS: Brand: MEDLINE

## (undated) DEVICE — BANDAGE COMPR W3INXL5YD COT ZN OXIDE TAN E ADH HVYWT

## (undated) DEVICE — GARMENT,MEDLINE,DVT,INT,CALF,MED, GEN2: Brand: MEDLINE

## (undated) DEVICE — SUTURE VCRL SZ 4-0 L18IN ABSRB UD L24MM PS-1 3/8 CIR PRIM J682H

## (undated) DEVICE — GLOVE SURG SZ 65 THK91MIL LTX FREE SYN POLYISOPRENE

## (undated) DEVICE — PACK PROCEDURE SURG C SECT REV 1

## (undated) DEVICE — SUTURE ABSORBABLE BRAIDED 0 CT1 18 IN STRATAFIX SPRL SXMD1B409

## (undated) DEVICE — SUTURE COAT VCRL SZ 0 L36IN ABSRB VLT CTX L48MM TAPERPOINT J370H

## (undated) DEVICE — D & C-LF: Brand: MEDLINE INDUSTRIES, INC.

## (undated) DEVICE — TRAY URIN CATH 16FR DRNGE BG STATLOK STBL DEV F SURSTP

## (undated) DEVICE — COVER LT HNDL BLU PLAS

## (undated) DEVICE — SUTURE VCRL SZ 3-0 L27IN ABSRB UD L36MM CT-1 1/2 CIR J258H

## (undated) DEVICE — PENCIL SMK EVAC L10FT TBNG NONSTICK ESU BLDE PLUMEPEN ELITE

## (undated) DEVICE — MERCY FAIRFIELD TURNOVER KIT: Brand: MEDLINE INDUSTRIES, INC.

## (undated) DEVICE — 3M(TM) MEDIPORE(TM) +PAD SOFT CLOTH ADHESIVE WOUND DRESSING 3571: Brand: 3M™ MEDIPORE™

## (undated) DEVICE — PAD DRESSING TELFA OUCHLESS NONADH 3X8IN

## (undated) DEVICE — TRAP TISS DISP FOR COLL SYS BERK SAFETOUCH

## (undated) DEVICE — CHLORAPREP 26ML ORANGE

## (undated) DEVICE — NON-ADHERENT PAD: Brand: TELFA